# Patient Record
Sex: FEMALE | Race: WHITE | NOT HISPANIC OR LATINO | Employment: UNEMPLOYED | URBAN - NONMETROPOLITAN AREA
[De-identification: names, ages, dates, MRNs, and addresses within clinical notes are randomized per-mention and may not be internally consistent; named-entity substitution may affect disease eponyms.]

---

## 2018-10-23 ENCOUNTER — CONSULT (OUTPATIENT)
Dept: CARDIOLOGY | Facility: CLINIC | Age: 38
End: 2018-10-23

## 2018-10-23 VITALS
BODY MASS INDEX: 25.55 KG/M2 | WEIGHT: 159 LBS | DIASTOLIC BLOOD PRESSURE: 72 MMHG | SYSTOLIC BLOOD PRESSURE: 118 MMHG | HEART RATE: 60 BPM | HEIGHT: 66 IN

## 2018-10-23 DIAGNOSIS — R07.89 OTHER CHEST PAIN: Primary | ICD-10-CM

## 2018-10-23 DIAGNOSIS — R42 DIZZINESS: ICD-10-CM

## 2018-10-23 DIAGNOSIS — Z72.0 TOBACCO ABUSE: ICD-10-CM

## 2018-10-23 DIAGNOSIS — E78.00 HYPERCHOLESTEREMIA: ICD-10-CM

## 2018-10-23 DIAGNOSIS — R06.02 SHORTNESS OF BREATH: ICD-10-CM

## 2018-10-23 DIAGNOSIS — R01.1 MURMUR, CARDIAC: ICD-10-CM

## 2018-10-23 PROCEDURE — 99406 BEHAV CHNG SMOKING 3-10 MIN: CPT | Performed by: INTERNAL MEDICINE

## 2018-10-23 PROCEDURE — 93000 ELECTROCARDIOGRAM COMPLETE: CPT | Performed by: INTERNAL MEDICINE

## 2018-10-23 PROCEDURE — 99204 OFFICE O/P NEW MOD 45 MIN: CPT | Performed by: INTERNAL MEDICINE

## 2018-10-23 RX ORDER — NICOTINE 21 MG/24HR
1 PATCH, TRANSDERMAL 24 HOURS TRANSDERMAL EVERY 24 HOURS
Qty: 30 PATCH | Refills: 6 | Status: ON HOLD | OUTPATIENT
Start: 2018-10-23 | End: 2022-07-01

## 2018-10-23 RX ORDER — OMEPRAZOLE 20 MG/1
20 CAPSULE, DELAYED RELEASE ORAL DAILY
Status: ON HOLD | COMMUNITY
End: 2022-07-05 | Stop reason: SDUPTHER

## 2018-10-23 RX ORDER — BUPRENORPHINE HYDROCHLORIDE AND NALOXONE HYDROCHLORIDE DIHYDRATE 8; 2 MG/1; MG/1
1.75 TABLET SUBLINGUAL DAILY
Status: ON HOLD | COMMUNITY
End: 2022-07-05 | Stop reason: SDUPTHER

## 2018-10-23 RX ORDER — TRAZODONE HYDROCHLORIDE 100 MG/1
100 TABLET ORAL NIGHTLY
Status: ON HOLD | COMMUNITY
End: 2022-07-01

## 2018-10-23 RX ORDER — ALBUTEROL SULFATE 90 UG/1
2 AEROSOL, METERED RESPIRATORY (INHALATION) EVERY 4 HOURS PRN
Status: ON HOLD | COMMUNITY
End: 2022-07-05 | Stop reason: SDUPTHER

## 2018-10-23 RX ORDER — CLONIDINE HYDROCHLORIDE 0.1 MG/1
0.1 TABLET ORAL NIGHTLY
Status: ON HOLD | COMMUNITY
End: 2022-07-01

## 2018-10-23 RX ORDER — FLUOXETINE HYDROCHLORIDE 40 MG/1
80 CAPSULE ORAL DAILY
Status: ON HOLD | COMMUNITY
End: 2022-07-05 | Stop reason: SDUPTHER

## 2018-10-23 NOTE — PROGRESS NOTES
"Chief Complaint   Patient presents with   • Establish Care     Patient referred due to abnormal EKG.   • Chest Pain     She reports having pressure to left chest that radiated to shoulders. Had episode 2 weeks ago when outside sharp pain to left chest, had nausea and vomiting. She states \"everything went black for a few seconds\". She states \"the other night I was busting wood and it felt like my heart was going to explode\".   • Dizziness     started 2 weeks ago and continues to have random dizziness.   • Shortness of Breath     She states has had for long period of time, patient does continue to smoke.   • Aspirin     Patient does not take.        CARDIAC COMPLAINTS  chest pressure/discomfort, dyspnea and Dizziness      Subjective   Juanita Barnett is a 38 y.o. female came in today for her initial cardiac evaluation.  She has history of tobacco use and also history of drug use in the past.  For the last few years, she has been using Suboxone to help manage her drug habit.  She has been noticing chest pain for the last few weeks.  About 2 weeks ago she was outside busting wood, when she started noticing significant headache, then she started noticing dizziness and almost passed out.  She had sharp left-sided chest pain associated with nausea and vomiting.  Since then, she has been having few episodes of chest pain and dizziness.  She had some labs done and most of her renal function and liver functions were normal she also has history of chronically abnormal hepatitis A,B as well as hepatitis C antibodies.  She also has problem in the form of some kind of clotting disorder.  In 2004, she also had history of pulmonary embolism.  She has not been checked for any gallbladder abnormality.  She unfortunately still continues to smoke.    No past surgical history on file.    Current Outpatient Prescriptions   Medication Sig Dispense Refill   • albuterol (PROVENTIL HFA;VENTOLIN HFA) 108 (90 Base) MCG/ACT inhaler " Inhale 2 puffs Every 6 (Six) Hours As Needed for Wheezing.     • buprenorphine-naloxone (SUBOXONE) 8-2 MG per SL tablet 1 1/2 tablets daily     • CloNIDine (CATAPRES) 0.1 MG tablet Take 0.1 mg by mouth Every Night.     • FLUoxetine (PROzac) 20 MG capsule Take 20 mg by mouth Daily.     • omeprazole (priLOSEC) 20 MG capsule Take 20 mg by mouth Daily.     • traZODone (DESYREL) 100 MG tablet Take 100 mg by mouth Every Night.     • nicotine (NICODERM CQ) 21 MG/24HR patch Place 1 patch on the skin as directed by provider Daily. 30 patch 6   • varenicline (CHANTIX STARTING MONTH PAK) 0.5 MG X 11 & 1 MG X 42 tablet Take 0.5 mg one daily on days 1-3 and and 0.5 mg twice daily on days 4-7.Then 1 mg twice daily for a total of 12 weeks. 60 tablet 6     No current facility-administered medications for this visit.            ALLERGIES:  Sulfa antibiotics    Past Medical History:   Diagnosis Date   • Clotting disorder (CMS/HCC)    • Difficulty sleeping    • GERD (gastroesophageal reflux disease)    • Hepatitis C    • Hx of hysterectomy    • Hypertension    • Opiate dependence (CMS/HCC)    • Pulmonary embolism (CMS/HCC) 2004    x2        History   Smoking Status   • Current Every Day Smoker   • Packs/day: 2.00   Smokeless Tobacco   • Never Used          Family History   Problem Relation Age of Onset   • No Known Problems Mother    • No Known Problems Father    • No Known Problems Brother    • Heart disease Maternal Grandmother    • Mitral valve prolapse Maternal Grandmother    • Hyperlipidemia Maternal Grandmother        Review of Systems   Constitution: Positive for malaise/fatigue. Negative for decreased appetite.   HENT: Negative for congestion and sore throat.    Eyes: Negative for blurred vision.   Cardiovascular: Positive for chest pain.   Respiratory: Positive for cough and shortness of breath. Negative for snoring.    Endocrine: Negative for cold intolerance and heat intolerance.   Hematologic/Lymphatic: Negative for  "adenopathy. Does not bruise/bleed easily.   Skin: Negative for itching, nail changes and skin cancer.   Musculoskeletal: Negative for arthritis and myalgias.   Gastrointestinal: Positive for nausea. Negative for abdominal pain, dysphagia and heartburn.   Genitourinary: Negative for bladder incontinence and frequency.   Neurological: Negative for dizziness, light-headedness, seizures and vertigo.   Psychiatric/Behavioral: Negative for altered mental status.   Allergic/Immunologic: Negative for environmental allergies and hives.       Diabetes- No  Thyroid- normal    Objective     /72 (BP Location: Right arm)   Pulse 60   Ht 167.6 cm (66\")   Wt 72.1 kg (159 lb)   BMI 25.66 kg/m²     Physical Exam   Constitutional: She is oriented to person, place, and time. She appears well-developed and well-nourished.   HENT:   Head: Normocephalic.   Nose: Nose normal.   Eyes: Pupils are equal, round, and reactive to light. EOM are normal.   Neck: Normal range of motion. Neck supple.   Cardiovascular: Normal rate, regular rhythm, S1 normal and S2 normal.    Murmur heard.  Pulmonary/Chest: Effort normal and breath sounds normal.   Abdominal: Soft. Bowel sounds are normal.   Musculoskeletal: Normal range of motion. She exhibits no edema.   Neurological: She is alert and oriented to person, place, and time.   Skin: Skin is warm and dry.   Psychiatric: She has a normal mood and affect.         ECG 12 Lead  Date/Time: 10/23/2018 1:22 PM  Performed by: YESSENIA FERNANDEZ  Authorized by: YESSENIA FERNANDEZ   Previous ECG: no previous ECG available  Rhythm: sinus rhythm  Rate: normal  QRS axis: normal  Clinical impression: normal ECG              Assessment/Plan   Patient's Body mass index is 25.66 kg/m². BMI is above normal parameters. Recommendations include: nutrition counseling.     Juanita was seen today for establish care, chest pain, dizziness, shortness of breath and aspirin.    Diagnoses and all orders for this " visit:    Other chest pain  -     Treadmill Stress Test; Future  -     High Sensitivity CRP; Future    Shortness of breath  -     Adult Transthoracic Echo Complete W/ Cont if Necessary Per Protocol; Future    Dizziness  -     Adult Transthoracic Echo Complete W/ Cont if Necessary Per Protocol; Future    Murmur, cardiac    Tobacco abuse  -     varenicline (CHANTIX STARTING MONTH PAK) 0.5 MG X 11 & 1 MG X 42 tablet; Take 0.5 mg one daily on days 1-3 and and 0.5 mg twice daily on days 4-7.Then 1 mg twice daily for a total of 12 weeks.  -     Discontinue: nicotine (NICOTROL) 10 MG inhaler; Inhale 1 puff As Needed for Smoking Cessation.  -     nicotine (NICODERM CQ) 21 MG/24HR patch; Place 1 patch on the skin as directed by provider Daily.    Hypercholesteremia  -     Lipid Panel; Future     At baseline, her heart rate and blood pressure appears to be within normal limit.  Her BMI is borderline elevated at 26.  Her EKG showed normal sinus rhythm with no significant ST-T changes.  Her clinical examination was unremarkable other than short systolic murmur at the mitral area.  She did not have any carotid bruit at this time.  I had a long talk with her about the smoking and the increased risk.  She is willing to try to quit smoking with the use of Chantix and nicotine patches.  I did give her a prescription for the same.  I scheduled her to undergo an echocardiogram to evaluate the LV function and the valvular structures.  I also scheduled her to undergo a regular stress test to evaluate her functional status, chronotropic response and to rule out any stress-induced arrhythmia and ischemia.  She also is going to have labs done to check her lipids and CRP level.  She is already taking a PPI and will continue the same.  Based on the results of these tests, further recommendations will be made.               Electronically signed by Mayra Prajapati MD October 23, 2018 1:13 PM

## 2018-10-23 NOTE — PROGRESS NOTES
I advised Juanita of the risks of continuing to use tobacco, and I provided her with tobacco cessation educational materials in the After Visit Summary.     During this visit, I spent 3-4 minutes counseling the patient regarding tobacco cessation.

## 2018-10-25 ENCOUNTER — HOSPITAL ENCOUNTER (OUTPATIENT)
Dept: CARDIOLOGY | Facility: HOSPITAL | Age: 38
Discharge: HOME | End: 2018-10-25
Attending: INTERNAL MEDICINE | Admitting: INTERNAL MEDICINE

## 2018-10-25 ENCOUNTER — HOSPITAL ENCOUNTER (OUTPATIENT)
Dept: CARDIOLOGY | Facility: HOSPITAL | Age: 38
Discharge: HOME | End: 2018-10-25
Attending: INTERNAL MEDICINE

## 2018-10-25 DIAGNOSIS — R06.02 SHORTNESS OF BREATH: ICD-10-CM

## 2018-10-25 DIAGNOSIS — R42 DIZZINESS: ICD-10-CM

## 2018-10-25 DIAGNOSIS — R07.89 OTHER CHEST PAIN: ICD-10-CM

## 2018-10-25 LAB
BH CV ECHO MEAS - ACS: 2 CM
BH CV ECHO MEAS - AO MAX PG: 4.9 MMHG
BH CV ECHO MEAS - AO MEAN PG: 2.9 MMHG
BH CV ECHO MEAS - AO ROOT AREA (BSA CORRECTED): 1.7
BH CV ECHO MEAS - AO ROOT AREA: 7.4 CM^2
BH CV ECHO MEAS - AO ROOT DIAM: 3.1 CM
BH CV ECHO MEAS - AO V2 MAX: 111.1 CM/SEC
BH CV ECHO MEAS - AO V2 MEAN: 80.2 CM/SEC
BH CV ECHO MEAS - AO V2 VTI: 27.5 CM
BH CV ECHO MEAS - BSA(HAYCOCK): 1.8 M^2
BH CV ECHO MEAS - BSA: 1.8 M^2
BH CV ECHO MEAS - BZI_BMI: 25.7 KILOGRAMS/M^2
BH CV ECHO MEAS - BZI_METRIC_HEIGHT: 167.6 CM
BH CV ECHO MEAS - BZI_METRIC_WEIGHT: 72.1 KG
BH CV ECHO MEAS - EDV(CUBED): 64 ML
BH CV ECHO MEAS - EDV(TEICH): 70 ML
BH CV ECHO MEAS - EF(CUBED): 73.6 %
BH CV ECHO MEAS - EF(TEICH): 66 %
BH CV ECHO MEAS - ESV(CUBED): 16.9 ML
BH CV ECHO MEAS - ESV(TEICH): 23.8 ML
BH CV ECHO MEAS - FS: 35.8 %
BH CV ECHO MEAS - IVS/LVPW: 1.4
BH CV ECHO MEAS - IVSD: 1 CM
BH CV ECHO MEAS - LA DIMENSION: 3.6 CM
BH CV ECHO MEAS - LA/AO: 1.2
BH CV ECHO MEAS - LV IVRT: 0.11 SEC
BH CV ECHO MEAS - LV MASS(C)D: 104.9 GRAMS
BH CV ECHO MEAS - LV MASS(C)DI: 57.8 GRAMS/M^2
BH CV ECHO MEAS - LVIDD: 4 CM
BH CV ECHO MEAS - LVIDS: 2.6 CM
BH CV ECHO MEAS - LVPWD: 0.73 CM
BH CV ECHO MEAS - MITRAL HR: 98 BPM
BH CV ECHO MEAS - MITRAL R-R: 0.61 SEC
BH CV ECHO MEAS - MR MAX PG: 29.8 MMHG
BH CV ECHO MEAS - MR MAX VEL: 272.8 CM/SEC
BH CV ECHO MEAS - MV A MAX VEL: 44 CM/SEC
BH CV ECHO MEAS - MV DEC SLOPE: 371.3 CM/SEC^2
BH CV ECHO MEAS - MV DEC TIME: 0.23 SEC
BH CV ECHO MEAS - MV E MAX VEL: 86.1 CM/SEC
BH CV ECHO MEAS - MV E/A: 2
BH CV ECHO MEAS - MV MAX PG: 5 MMHG
BH CV ECHO MEAS - MV MEAN PG: 1.4 MMHG
BH CV ECHO MEAS - MV V2 MAX: 111.3 CM/SEC
BH CV ECHO MEAS - MV V2 MEAN: 51.9 CM/SEC
BH CV ECHO MEAS - MV V2 VTI: 31.8 CM
BH CV ECHO MEAS - PA MAX PG: 3.6 MMHG
BH CV ECHO MEAS - PA MEAN PG: 2.2 MMHG
BH CV ECHO MEAS - PA V2 MAX: 94.6 CM/SEC
BH CV ECHO MEAS - PA V2 MEAN: 70.9 CM/SEC
BH CV ECHO MEAS - PA V2 VTI: 21.6 CM
BH CV ECHO MEAS - PULM. HR: 197 BPM
BH CV ECHO MEAS - PULM. R-R: 0.3 SEC
BH CV ECHO MEAS - RAP SYSTOLE: 10 MMHG
BH CV ECHO MEAS - RVDD: 2.9 CM
BH CV ECHO MEAS - RVSP: 27.4 MMHG
BH CV ECHO MEAS - SI(AO): 111.8 ML/M^2
BH CV ECHO MEAS - SI(CUBED): 25.9 ML/M^2
BH CV ECHO MEAS - SI(TEICH): 25.4 ML/M^2
BH CV ECHO MEAS - SV(AO): 202.8 ML
BH CV ECHO MEAS - SV(CUBED): 47.1 ML
BH CV ECHO MEAS - SV(TEICH): 46.2 ML
BH CV ECHO MEAS - TR MAX VEL: 204.7 CM/SEC
BH CV STRESS DURATION MIN STAGE 1: 3
BH CV STRESS DURATION SEC STAGE 1: 0
BH CV STRESS GRADE STAGE 1: 10
BH CV STRESS METS STAGE 1: 5
BH CV STRESS PROTOCOL 1: NORMAL
BH CV STRESS RECOVERY BP: NORMAL MMHG
BH CV STRESS RECOVERY HR: 54 BPM
BH CV STRESS SPEED STAGE 1: 1.7
BH CV STRESS STAGE 1: 1
MAXIMAL PREDICTED HEART RATE: 182 BPM
MAXIMAL PREDICTED HEART RATE: 182 BPM
PERCENT MAX PREDICTED HR: 82.97 %
STRESS BASELINE BP: NORMAL MMHG
STRESS BASELINE HR: 73 BPM
STRESS PERCENT HR: 98 %
STRESS POST ESTIMATED WORKLOAD: 7 METS
STRESS POST EXERCISE DUR MIN: 4 MIN
STRESS POST EXERCISE DUR SEC: 32 SEC
STRESS POST PEAK BP: NORMAL MMHG
STRESS POST PEAK HR: 151 BPM
STRESS TARGET HR: 155 BPM
STRESS TARGET HR: 155 BPM

## 2018-10-25 PROCEDURE — 93018 CV STRESS TEST I&R ONLY: CPT | Performed by: INTERNAL MEDICINE

## 2018-10-25 PROCEDURE — 93017 CV STRESS TEST TRACING ONLY: CPT

## 2018-10-25 PROCEDURE — 93306 TTE W/DOPPLER COMPLETE: CPT | Performed by: INTERNAL MEDICINE

## 2018-10-25 PROCEDURE — 93306 TTE W/DOPPLER COMPLETE: CPT

## 2019-05-29 RX ORDER — OMEPRAZOLE 20 MG/1
CAPSULE, DELAYED RELEASE ORAL
Qty: 30 CAPSULE | OUTPATIENT
Start: 2019-05-29

## 2022-06-30 ENCOUNTER — HOSPITAL ENCOUNTER (EMERGENCY)
Facility: HOSPITAL | Age: 42
Discharge: PSYCHIATRIC OR SUBSTANCE USE FACILITY | End: 2022-06-30
Attending: EMERGENCY MEDICINE

## 2022-06-30 ENCOUNTER — HOSPITAL ENCOUNTER (INPATIENT)
Facility: HOSPITAL | Age: 42
LOS: 5 days | Discharge: HOME | End: 2022-07-05
Attending: PSYCHIATRY & NEUROLOGY | Admitting: PSYCHIATRY & NEUROLOGY

## 2022-06-30 VITALS
HEIGHT: 66 IN | OXYGEN SATURATION: 100 % | TEMPERATURE: 97.5 F | HEART RATE: 63 BPM | DIASTOLIC BLOOD PRESSURE: 90 MMHG | WEIGHT: 200 LBS | BODY MASS INDEX: 32.14 KG/M2 | SYSTOLIC BLOOD PRESSURE: 134 MMHG | RESPIRATION RATE: 18 BRPM

## 2022-06-30 DIAGNOSIS — F11.20 UNCOMPLICATED OPIOID DEPENDENCE: Primary | ICD-10-CM

## 2022-06-30 DIAGNOSIS — F13.10 BENZODIAZEPINE ABUSE: Primary | ICD-10-CM

## 2022-06-30 LAB
ALBUMIN SERPL-MCNC: 4.28 G/DL (ref 3.5–5.2)
ALBUMIN/GLOB SERPL: 1.6 G/DL
ALP SERPL-CCNC: 87 U/L (ref 39–117)
ALT SERPL W P-5'-P-CCNC: 8 U/L (ref 1–33)
AMPHET+METHAMPHET UR QL: NEGATIVE
AMPHETAMINES UR QL: POSITIVE
ANION GAP SERPL CALCULATED.3IONS-SCNC: 9.5 MMOL/L (ref 5–15)
AST SERPL-CCNC: 15 U/L (ref 1–32)
B-HCG UR QL: NEGATIVE
BARBITURATES UR QL SCN: NEGATIVE
BASOPHILS # BLD AUTO: 0.06 10*3/MM3 (ref 0–0.2)
BASOPHILS NFR BLD AUTO: 0.7 % (ref 0–1.5)
BENZODIAZ UR QL SCN: POSITIVE
BILIRUB SERPL-MCNC: 0.2 MG/DL (ref 0–1.2)
BILIRUB UR QL STRIP: NEGATIVE
BUN SERPL-MCNC: 9 MG/DL (ref 6–20)
BUN/CREAT SERPL: 11.4 (ref 7–25)
BUPRENORPHINE SERPL-MCNC: POSITIVE NG/ML
CALCIUM SPEC-SCNC: 9.5 MG/DL (ref 8.6–10.5)
CANNABINOIDS SERPL QL: POSITIVE
CHLORIDE SERPL-SCNC: 100 MMOL/L (ref 98–107)
CLARITY UR: ABNORMAL
CO2 SERPL-SCNC: 27.5 MMOL/L (ref 22–29)
COCAINE UR QL: NEGATIVE
COLOR UR: YELLOW
CREAT SERPL-MCNC: 0.79 MG/DL (ref 0.57–1)
DEPRECATED RDW RBC AUTO: 43.8 FL (ref 37–54)
EGFRCR SERPLBLD CKD-EPI 2021: 95.9 ML/MIN/1.73
EOSINOPHIL # BLD AUTO: 0.4 10*3/MM3 (ref 0–0.4)
EOSINOPHIL NFR BLD AUTO: 4.4 % (ref 0.3–6.2)
ERYTHROCYTE [DISTWIDTH] IN BLOOD BY AUTOMATED COUNT: 13.2 % (ref 12.3–15.4)
ETHANOL BLD-MCNC: <10 MG/DL (ref 0–10)
ETHANOL UR QL: <0.01 %
FLUAV SUBTYP SPEC NAA+PROBE: NOT DETECTED
FLUBV RNA ISLT QL NAA+PROBE: NOT DETECTED
GLOBULIN UR ELPH-MCNC: 2.7 GM/DL
GLUCOSE SERPL-MCNC: 94 MG/DL (ref 65–99)
GLUCOSE UR STRIP-MCNC: NEGATIVE MG/DL
HCT VFR BLD AUTO: 39.8 % (ref 34–46.6)
HGB BLD-MCNC: 13.4 G/DL (ref 12–15.9)
HGB UR QL STRIP.AUTO: NEGATIVE
IMM GRANULOCYTES # BLD AUTO: 0.02 10*3/MM3 (ref 0–0.05)
IMM GRANULOCYTES NFR BLD AUTO: 0.2 % (ref 0–0.5)
KETONES UR QL STRIP: NEGATIVE
LEUKOCYTE ESTERASE UR QL STRIP.AUTO: NEGATIVE
LYMPHOCYTES # BLD AUTO: 2.69 10*3/MM3 (ref 0.7–3.1)
LYMPHOCYTES NFR BLD AUTO: 29.9 % (ref 19.6–45.3)
MAGNESIUM SERPL-MCNC: 1.9 MG/DL (ref 1.6–2.6)
MCH RBC QN AUTO: 30.2 PG (ref 26.6–33)
MCHC RBC AUTO-ENTMCNC: 33.7 G/DL (ref 31.5–35.7)
MCV RBC AUTO: 89.6 FL (ref 79–97)
METHADONE UR QL SCN: NEGATIVE
MONOCYTES # BLD AUTO: 0.73 10*3/MM3 (ref 0.1–0.9)
MONOCYTES NFR BLD AUTO: 8.1 % (ref 5–12)
NEUTROPHILS NFR BLD AUTO: 5.11 10*3/MM3 (ref 1.7–7)
NEUTROPHILS NFR BLD AUTO: 56.7 % (ref 42.7–76)
NITRITE UR QL STRIP: NEGATIVE
NRBC BLD AUTO-RTO: 0 /100 WBC (ref 0–0.2)
OPIATES UR QL: NEGATIVE
OXYCODONE UR QL SCN: NEGATIVE
PCP UR QL SCN: NEGATIVE
PH UR STRIP.AUTO: 6.5 [PH] (ref 5–8)
PLATELET # BLD AUTO: 286 10*3/MM3 (ref 140–450)
PMV BLD AUTO: 9.7 FL (ref 6–12)
POTASSIUM SERPL-SCNC: 4 MMOL/L (ref 3.5–5.2)
PROPOXYPH UR QL: NEGATIVE
PROT SERPL-MCNC: 7 G/DL (ref 6–8.5)
PROT UR QL STRIP: NEGATIVE
RBC # BLD AUTO: 4.44 10*6/MM3 (ref 3.77–5.28)
SARS-COV-2 RNA PNL SPEC NAA+PROBE: NOT DETECTED
SODIUM SERPL-SCNC: 137 MMOL/L (ref 136–145)
SP GR UR STRIP: 1.02 (ref 1–1.03)
TRICYCLICS UR QL SCN: NEGATIVE
UROBILINOGEN UR QL STRIP: ABNORMAL
WBC NRBC COR # BLD: 9.01 10*3/MM3 (ref 3.4–10.8)

## 2022-06-30 PROCEDURE — HZ2ZZZZ DETOXIFICATION SERVICES FOR SUBSTANCE ABUSE TREATMENT: ICD-10-PCS | Performed by: PSYCHIATRY & NEUROLOGY

## 2022-06-30 PROCEDURE — 93010 ELECTROCARDIOGRAM REPORT: CPT | Performed by: INTERNAL MEDICINE

## 2022-06-30 PROCEDURE — 82077 ASSAY SPEC XCP UR&BREATH IA: CPT | Performed by: EMERGENCY MEDICINE

## 2022-06-30 PROCEDURE — 81025 URINE PREGNANCY TEST: CPT | Performed by: EMERGENCY MEDICINE

## 2022-06-30 PROCEDURE — 85025 COMPLETE CBC W/AUTO DIFF WBC: CPT | Performed by: EMERGENCY MEDICINE

## 2022-06-30 PROCEDURE — 36415 COLL VENOUS BLD VENIPUNCTURE: CPT

## 2022-06-30 PROCEDURE — 99284 EMERGENCY DEPT VISIT MOD MDM: CPT

## 2022-06-30 PROCEDURE — 81003 URINALYSIS AUTO W/O SCOPE: CPT | Performed by: EMERGENCY MEDICINE

## 2022-06-30 PROCEDURE — 93005 ELECTROCARDIOGRAM TRACING: CPT | Performed by: PSYCHIATRY & NEUROLOGY

## 2022-06-30 PROCEDURE — 80306 DRUG TEST PRSMV INSTRMNT: CPT | Performed by: EMERGENCY MEDICINE

## 2022-06-30 PROCEDURE — 63710000001 ONDANSETRON PER 8 MG: Performed by: PSYCHIATRY & NEUROLOGY

## 2022-06-30 PROCEDURE — 99285 EMERGENCY DEPT VISIT HI MDM: CPT

## 2022-06-30 PROCEDURE — 80053 COMPREHEN METABOLIC PANEL: CPT | Performed by: EMERGENCY MEDICINE

## 2022-06-30 PROCEDURE — 83735 ASSAY OF MAGNESIUM: CPT | Performed by: EMERGENCY MEDICINE

## 2022-06-30 PROCEDURE — 87636 SARSCOV2 & INF A&B AMP PRB: CPT | Performed by: EMERGENCY MEDICINE

## 2022-06-30 RX ORDER — CLONIDINE HYDROCHLORIDE 0.1 MG/1
0.1 TABLET ORAL 4 TIMES DAILY PRN
Status: ACTIVE | OUTPATIENT
Start: 2022-07-01 | End: 2022-07-02

## 2022-06-30 RX ORDER — BENZONATATE 100 MG/1
100 CAPSULE ORAL 3 TIMES DAILY PRN
Status: DISCONTINUED | OUTPATIENT
Start: 2022-06-30 | End: 2022-07-05 | Stop reason: HOSPADM

## 2022-06-30 RX ORDER — LORAZEPAM 0.5 MG/1
0.5 TABLET ORAL
Status: COMPLETED | OUTPATIENT
Start: 2022-07-04 | End: 2022-07-04

## 2022-06-30 RX ORDER — LORAZEPAM 2 MG/1
2 TABLET ORAL
Status: COMPLETED | OUTPATIENT
Start: 2022-07-01 | End: 2022-07-01

## 2022-06-30 RX ORDER — ACETAMINOPHEN 325 MG/1
650 TABLET ORAL EVERY 6 HOURS PRN
Status: DISCONTINUED | OUTPATIENT
Start: 2022-06-30 | End: 2022-07-05 | Stop reason: HOSPADM

## 2022-06-30 RX ORDER — LORAZEPAM 2 MG/1
2 TABLET ORAL
Status: ACTIVE | OUTPATIENT
Start: 2022-06-30 | End: 2022-07-01

## 2022-06-30 RX ORDER — FAMOTIDINE 20 MG/1
20 TABLET, FILM COATED ORAL 2 TIMES DAILY PRN
Status: DISCONTINUED | OUTPATIENT
Start: 2022-06-30 | End: 2022-07-05 | Stop reason: HOSPADM

## 2022-06-30 RX ORDER — LORAZEPAM 0.5 MG/1
0.5 TABLET ORAL EVERY 4 HOURS PRN
Status: ACTIVE | OUTPATIENT
Start: 2022-07-04 | End: 2022-07-05

## 2022-06-30 RX ORDER — CLONIDINE HYDROCHLORIDE 0.1 MG/1
0.1 TABLET ORAL 3 TIMES DAILY PRN
Status: ACTIVE | OUTPATIENT
Start: 2022-07-02 | End: 2022-07-03

## 2022-06-30 RX ORDER — LORAZEPAM 2 MG/1
2 TABLET ORAL EVERY 4 HOURS PRN
Status: ACTIVE | OUTPATIENT
Start: 2022-07-01 | End: 2022-07-02

## 2022-06-30 RX ORDER — ECHINACEA PURPUREA EXTRACT 125 MG
2 TABLET ORAL AS NEEDED
Status: DISCONTINUED | OUTPATIENT
Start: 2022-06-30 | End: 2022-07-05 | Stop reason: HOSPADM

## 2022-06-30 RX ORDER — LOPERAMIDE HYDROCHLORIDE 2 MG/1
2 CAPSULE ORAL
Status: DISCONTINUED | OUTPATIENT
Start: 2022-06-30 | End: 2022-07-05 | Stop reason: HOSPADM

## 2022-06-30 RX ORDER — LORAZEPAM 1 MG/1
1 TABLET ORAL EVERY 4 HOURS PRN
Status: ACTIVE | OUTPATIENT
Start: 2022-07-03 | End: 2022-07-04

## 2022-06-30 RX ORDER — ONDANSETRON 4 MG/1
4 TABLET, FILM COATED ORAL EVERY 6 HOURS PRN
Status: DISCONTINUED | OUTPATIENT
Start: 2022-06-30 | End: 2022-07-05 | Stop reason: HOSPADM

## 2022-06-30 RX ORDER — TRAZODONE HYDROCHLORIDE 50 MG/1
50 TABLET ORAL NIGHTLY PRN
Status: DISCONTINUED | OUTPATIENT
Start: 2022-06-30 | End: 2022-07-05 | Stop reason: HOSPADM

## 2022-06-30 RX ORDER — LORAZEPAM 1 MG/1
1 TABLET ORAL
Status: COMPLETED | OUTPATIENT
Start: 2022-07-03 | End: 2022-07-03

## 2022-06-30 RX ORDER — NICOTINE 21 MG/24HR
1 PATCH, TRANSDERMAL 24 HOURS TRANSDERMAL
Status: DISCONTINUED | OUTPATIENT
Start: 2022-07-01 | End: 2022-07-05 | Stop reason: HOSPADM

## 2022-06-30 RX ORDER — HYDROXYZINE 50 MG/1
50 TABLET, FILM COATED ORAL EVERY 6 HOURS PRN
Status: DISCONTINUED | OUTPATIENT
Start: 2022-06-30 | End: 2022-07-05 | Stop reason: HOSPADM

## 2022-06-30 RX ORDER — CYCLOBENZAPRINE HCL 10 MG
10 TABLET ORAL 3 TIMES DAILY PRN
Status: DISCONTINUED | OUTPATIENT
Start: 2022-06-30 | End: 2022-07-05 | Stop reason: HOSPADM

## 2022-06-30 RX ORDER — IBUPROFEN 400 MG/1
400 TABLET ORAL EVERY 6 HOURS PRN
Status: DISCONTINUED | OUTPATIENT
Start: 2022-06-30 | End: 2022-07-05 | Stop reason: HOSPADM

## 2022-06-30 RX ORDER — ALUMINA, MAGNESIA, AND SIMETHICONE 2400; 2400; 240 MG/30ML; MG/30ML; MG/30ML
15 SUSPENSION ORAL EVERY 6 HOURS PRN
Status: DISCONTINUED | OUTPATIENT
Start: 2022-06-30 | End: 2022-07-05 | Stop reason: HOSPADM

## 2022-06-30 RX ORDER — BENZTROPINE MESYLATE 1 MG/ML
1 INJECTION INTRAMUSCULAR; INTRAVENOUS ONCE AS NEEDED
Status: DISCONTINUED | OUTPATIENT
Start: 2022-06-30 | End: 2022-07-05 | Stop reason: HOSPADM

## 2022-06-30 RX ORDER — CLONIDINE HYDROCHLORIDE 0.1 MG/1
0.1 TABLET ORAL DAILY PRN
Status: ACTIVE | OUTPATIENT
Start: 2022-07-04 | End: 2022-07-05

## 2022-06-30 RX ORDER — BENZTROPINE MESYLATE 1 MG/1
2 TABLET ORAL ONCE AS NEEDED
Status: DISCONTINUED | OUTPATIENT
Start: 2022-06-30 | End: 2022-07-05 | Stop reason: HOSPADM

## 2022-06-30 RX ORDER — CLONIDINE HYDROCHLORIDE 0.1 MG/1
0.1 TABLET ORAL 2 TIMES DAILY PRN
Status: ACTIVE | OUTPATIENT
Start: 2022-07-03 | End: 2022-07-04

## 2022-06-30 RX ORDER — CLONIDINE HYDROCHLORIDE 0.1 MG/1
0.1 TABLET ORAL 4 TIMES DAILY PRN
Status: ACTIVE | OUTPATIENT
Start: 2022-06-30 | End: 2022-07-01

## 2022-06-30 RX ORDER — DICYCLOMINE HYDROCHLORIDE 10 MG/1
10 CAPSULE ORAL 3 TIMES DAILY PRN
Status: DISCONTINUED | OUTPATIENT
Start: 2022-06-30 | End: 2022-07-05 | Stop reason: HOSPADM

## 2022-06-30 RX ADMIN — TRAZODONE HYDROCHLORIDE 50 MG: 50 TABLET ORAL at 22:14

## 2022-06-30 RX ADMIN — ONDANSETRON HYDROCHLORIDE 4 MG: 4 TABLET, FILM COATED ORAL at 22:14

## 2022-06-30 RX ADMIN — IBUPROFEN 400 MG: 400 TABLET, FILM COATED ORAL at 22:14

## 2022-06-30 RX ADMIN — HYDROXYZINE HYDROCHLORIDE 50 MG: 50 TABLET ORAL at 22:14

## 2022-07-01 LAB
HAV IGM SERPL QL IA: ABNORMAL
HBV CORE IGM SERPL QL IA: ABNORMAL
HBV SURFACE AG SERPL QL IA: ABNORMAL
HCV AB SER DONR QL: REACTIVE
HIV1+2 AB SER QL: NORMAL
QT INTERVAL: 410 MS
QTC INTERVAL: 399 MS

## 2022-07-01 PROCEDURE — G0432 EIA HIV-1/HIV-2 SCREEN: HCPCS | Performed by: PSYCHIATRY & NEUROLOGY

## 2022-07-01 PROCEDURE — 99223 1ST HOSP IP/OBS HIGH 75: CPT | Performed by: PSYCHIATRY & NEUROLOGY

## 2022-07-01 PROCEDURE — 80074 ACUTE HEPATITIS PANEL: CPT | Performed by: PSYCHIATRY & NEUROLOGY

## 2022-07-01 RX ORDER — BUPRENORPHINE HYDROCHLORIDE AND NALOXONE HYDROCHLORIDE DIHYDRATE 8; 2 MG/1; MG/1
1.75 TABLET SUBLINGUAL DAILY
Status: DISCONTINUED | OUTPATIENT
Start: 2022-07-02 | End: 2022-07-05 | Stop reason: HOSPADM

## 2022-07-01 RX ORDER — CETIRIZINE HYDROCHLORIDE 10 MG/1
10 TABLET ORAL DAILY
Status: ON HOLD | COMMUNITY
End: 2022-07-05 | Stop reason: SDUPTHER

## 2022-07-01 RX ORDER — ALBUTEROL SULFATE 90 UG/1
2 AEROSOL, METERED RESPIRATORY (INHALATION) EVERY 4 HOURS PRN
Status: DISCONTINUED | OUTPATIENT
Start: 2022-07-01 | End: 2022-07-05 | Stop reason: HOSPADM

## 2022-07-01 RX ORDER — PANTOPRAZOLE SODIUM 40 MG/1
40 TABLET, DELAYED RELEASE ORAL
Status: DISCONTINUED | OUTPATIENT
Start: 2022-07-01 | End: 2022-07-05 | Stop reason: HOSPADM

## 2022-07-01 RX ORDER — ALBUTEROL SULFATE 90 UG/1
2 AEROSOL, METERED RESPIRATORY (INHALATION)
Status: DISCONTINUED | OUTPATIENT
Start: 2022-07-01 | End: 2022-07-01 | Stop reason: ALTCHOICE

## 2022-07-01 RX ORDER — PANTOPRAZOLE SODIUM 40 MG/1
40 TABLET, DELAYED RELEASE ORAL EVERY MORNING
Status: CANCELLED | OUTPATIENT
Start: 2022-07-01

## 2022-07-01 RX ORDER — FLUOXETINE HYDROCHLORIDE 20 MG/1
80 CAPSULE ORAL DAILY
Status: CANCELLED | OUTPATIENT
Start: 2022-07-01

## 2022-07-01 RX ORDER — BUPRENORPHINE HYDROCHLORIDE AND NALOXONE HYDROCHLORIDE DIHYDRATE 8; 2 MG/1; MG/1
2 TABLET SUBLINGUAL DAILY
Status: DISCONTINUED | OUTPATIENT
Start: 2022-07-01 | End: 2022-07-01 | Stop reason: ALTCHOICE

## 2022-07-01 RX ORDER — FLUOXETINE HYDROCHLORIDE 20 MG/1
40 CAPSULE ORAL DAILY
Status: DISCONTINUED | OUTPATIENT
Start: 2022-07-01 | End: 2022-07-05 | Stop reason: HOSPADM

## 2022-07-01 RX ORDER — CETIRIZINE HYDROCHLORIDE 10 MG/1
10 TABLET ORAL DAILY
Status: DISCONTINUED | OUTPATIENT
Start: 2022-07-01 | End: 2022-07-05 | Stop reason: HOSPADM

## 2022-07-01 RX ORDER — CETIRIZINE HYDROCHLORIDE 10 MG/1
10 TABLET ORAL DAILY
Status: CANCELLED | OUTPATIENT
Start: 2022-07-01

## 2022-07-01 RX ORDER — BUPRENORPHINE HYDROCHLORIDE AND NALOXONE HYDROCHLORIDE DIHYDRATE 8; 2 MG/1; MG/1
1.75 TABLET SUBLINGUAL DAILY
Status: CANCELLED | OUTPATIENT
Start: 2022-07-01

## 2022-07-01 RX ADMIN — BUPRENORPHINE HYDROCHLORIDE AND NALOXONE HYDROCHLORIDE DIHYDRATE 2 TABLET: 8; 2 TABLET SUBLINGUAL at 10:55

## 2022-07-01 RX ADMIN — IBUPROFEN 400 MG: 400 TABLET, FILM COATED ORAL at 14:31

## 2022-07-01 RX ADMIN — TRAZODONE HYDROCHLORIDE 50 MG: 50 TABLET ORAL at 21:31

## 2022-07-01 RX ADMIN — HYDROXYZINE HYDROCHLORIDE 50 MG: 50 TABLET ORAL at 21:31

## 2022-07-01 RX ADMIN — PANTOPRAZOLE SODIUM 40 MG: 40 TABLET, DELAYED RELEASE ORAL at 10:55

## 2022-07-01 RX ADMIN — FLUOXETINE HYDROCHLORIDE 40 MG: 20 CAPSULE ORAL at 10:55

## 2022-07-01 RX ADMIN — LORAZEPAM 2 MG: 2 TABLET ORAL at 09:04

## 2022-07-01 RX ADMIN — LORAZEPAM 2 MG: 2 TABLET ORAL at 21:31

## 2022-07-01 RX ADMIN — LORAZEPAM 2 MG: 2 TABLET ORAL at 14:32

## 2022-07-01 NOTE — PLAN OF CARE
Problem: Adult Behavioral Health Plan of Care  Goal: Plan of Care Review  Outcome: Ongoing, Progressing  Flowsheets  Taken 7/1/2022 1850  Progress: no change  Plan of Care Reviewed With: patient  Patient Agreement with Plan of Care: agrees  Outcome Evaluation: pt pleasant and cooperative  Taken 7/1/2022 0920  Plan of Care Reviewed With: patient  Patient Agreement with Plan of Care: agrees   Goal Outcome Evaluation:  Plan of Care Reviewed With: patient  Patient Agreement with Plan of Care: agrees     Progress: no change  Outcome Evaluation: pt yolanda and cooperative
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
## 2022-07-01 NOTE — PLAN OF CARE
"Goal Outcome Evaluation:  Plan of Care Reviewed With: patient  Patient Agreement with Plan of Care: agrees     Progress: no change  Outcome Evaluation: Patient was new admit this shift, slept through the night, rated anxiety and depression 9/10, denied SI/HI/AVH, pain 8/10 \"all over\", cravings 10/10, no complaints this shift.  "
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
## 2022-07-01 NOTE — PLAN OF CARE
Goal Outcome Evaluation:  Plan of Care Reviewed With: patient  Patient Agreement with Plan of Care: agrees  Consent Given to Review Plan with: Phu  Progress: no change  Outcome Evaluation: Met with patient in the office, completing the social history assessment and reviewing the treatment plans. Patient agreeable.      Problem: Adult Behavioral Health Plan of Care  Goal: Plan of Care Review  Outcome: Ongoing, Progressing  Flowsheets (Taken 7/1/2022 0938)  Consent Given to Review Plan with: Phu  Progress: no change  Plan of Care Reviewed With: patient  Patient Agreement with Plan of Care: agrees  Outcome Evaluation: Met with patient in the office, completing the social history assessment and reviewing the treatment plans. Patient agreeable.     Problem: Adult Behavioral Health Plan of Care  Goal: Patient-Specific Goal (Individualization)  Outcome: Ongoing, Progressing  Flowsheets (Taken 7/1/2022 0938)  Patient Personal Strengths:   resilient   resourceful  Patient-Specific Goals (Include Timeframe): Identify 1-2 cognitive distortions  Individualized Care Needs: CBT  Anxieties, Fears or Concerns: Concerned with finding relief from withdrawal symptoms  Patient Vulnerabilities:   substance abuse/addiction   limited support system   occupational insecurity   poor impulse control   lacks insight into illness     Problem: Adult Behavioral Health Plan of Care  Goal: Optimized Coping Skills in Response to Life Stressors  Outcome: Ongoing, Progressing  Flowsheets (Taken 7/1/2022 0938)  Optimized Coping Skills in Response to Life Stressors: making progress toward outcome     Problem: Adult Behavioral Health Plan of Care  Goal: Optimized Coping Skills in Response to Life Stressors  Intervention: Promote Effective Coping Strategies  Flowsheets (Taken 7/1/2022 0938)  Supportive Measures:   active listening utilized   positive reinforcement provided   self-responsibility promoted   verbalization of feelings encouraged    problem-solving facilitated   counseling provided   self-reflection promoted     Problem: Adult Behavioral Health Plan of Care  Goal: Develops/Participates in Therapeutic [place] to Support Successful Transition  Outcome: Ongoing, Progressing  Flowsheets (Taken 7/1/2022 0938)  Develops/Participates in Therapeutic [place] to Support Successful Transition: making progress toward outcome     Problem: Adult Behavioral Health Plan of Care  Goal: Develops/Participates in Therapeutic [place] to Support Successful Transition  Intervention: Foster Therapeutic [place]  Flowsheets (Taken 7/1/2022 0938)  Trust Relationship/Rapport:   care explained   reassurance provided   choices provided   emotional support provided   thoughts/feelings acknowledged   empathic listening provided   questions answered   questions encouraged     Problem: Adult Behavioral Health Plan of Care  Goal: Develops/Participates in Therapeutic [place] to Support Successful Transition  Intervention: Mutually Develop Transition Plan  Flowsheets (Taken 7/1/2022 0938)  Outpatient/Agency/Support Group Needs:   12 step program (specify)   residential services   support group(s) (specify)  Discharge Coordination/Progress: Phu  Transition Support: follow-up care discussed  Transportation Anticipated: agency  Anticipated Discharge Disposition: residential substance use unit  Transportation Concerns: none  Current Discharge Risk: substance use/abuse  Concerns to be Addressed:   mental health   discharge planning   substance/tobacco abuse/use   coping/stress  Readmission Within the Last 30 Days: no previous admission in last 30 days  Patient/Family Anticipated Services at Transition: rehabilitation services  Patient's Choice of Community Agency(s): Phu  Patient/Family Anticipates Transition to: inpatient rehabilitation facility  Offered/Gave Vendor List: no       [phone]  D: Patient is a 42-year-old  female currently residing in [place] where she has been living with her brother and sister-in-law for the past 10 years.  She reported her mother lives next door, and her uncle visits frequently.  All of her family members use drugs.  The patient has a high school education and has completed approximately 2 college courses.  She is currently unemployed, having last worked 2 months ago.  She reported difficulty maintaining employment due to inconsistent supply of drugs and the resultant withdrawal symptoms.  She identified herself as an addict, reporting her drugs of choice were benzodiazepines and methamphetamine.  Patient is prescribed Suboxone.  She maintained sobriety for 2-1/2 years on 1 occasion.  The patient was in [facility] for some time, but chose to leave after they discouraged her from using Suboxone.  Patient planned to return to a sober living house, [facility], in [place] upon discharge.    A: Patient's affect appeared somber. Her mood appeared dysthymic. She was polite and cooperative. Seemed to have a relatively optimistic attitude. Motivation seemed fair, as she was willing to begin sober living, but unwilling to consider cessation from suboxone.     P: Patient has been placed on a detox regimen. She will be monitored routinely for her safety. She will be provided with individual and group therapy. She will follow up with sober living treatment at [facility].

## 2022-07-02 PROCEDURE — 99232 SBSQ HOSP IP/OBS MODERATE 35: CPT | Performed by: PSYCHIATRY & NEUROLOGY

## 2022-07-02 RX ORDER — AMOXICILLIN AND CLAVULANATE POTASSIUM 562.5; 437.5; 62.5 MG/1; MG/1; MG/1
1 TABLET, FILM COATED, EXTENDED RELEASE ORAL EVERY 12 HOURS SCHEDULED
Status: DISCONTINUED | OUTPATIENT
Start: 2022-07-02 | End: 2022-07-05 | Stop reason: HOSPADM

## 2022-07-02 RX ADMIN — BUPRENORPHINE HYDROCHLORIDE AND NALOXONE HYDROCHLORIDE DIHYDRATE 1.75 TABLET: 8; 2 TABLET SUBLINGUAL at 08:29

## 2022-07-02 RX ADMIN — TRAZODONE HYDROCHLORIDE 50 MG: 50 TABLET ORAL at 21:29

## 2022-07-02 RX ADMIN — AMOXICILLIN AND CLAVULANATE POTASSIUM 1 TABLET: 562.5; 437.5; 62.5 TABLET, MULTILAYER, EXTENDED RELEASE ORAL at 11:39

## 2022-07-02 RX ADMIN — FLUOXETINE HYDROCHLORIDE 40 MG: 20 CAPSULE ORAL at 08:28

## 2022-07-02 RX ADMIN — LORAZEPAM 1.5 MG: 1 TABLET ORAL at 08:29

## 2022-07-02 RX ADMIN — AMOXICILLIN AND CLAVULANATE POTASSIUM 1 TABLET: 562.5; 437.5; 62.5 TABLET, MULTILAYER, EXTENDED RELEASE ORAL at 21:29

## 2022-07-02 RX ADMIN — LORAZEPAM 1.5 MG: 1 TABLET ORAL at 21:29

## 2022-07-02 RX ADMIN — CETIRIZINE HYDROCHLORIDE 10 MG: 10 TABLET, FILM COATED ORAL at 08:29

## 2022-07-02 RX ADMIN — LORAZEPAM 1.5 MG: 1 TABLET ORAL at 14:44

## 2022-07-02 RX ADMIN — PANTOPRAZOLE SODIUM 40 MG: 40 TABLET, DELAYED RELEASE ORAL at 06:14

## 2022-07-02 NOTE — PROGRESS NOTES
"INPATIENT PSYCHIATRIC PROGRESS NOTE    Name:  Juanita Barnett  :  1980  MRN:  [id number]  Visit Number:  [id number]  Length of stay:  2    SUBJECTIVE    CC/Focus of Exam: Polysubstance Abuse and withdrawal    INTERVAL HISTORY: Patient reports this morning that she is doing relatively well with withdrawal symptoms and is having mild to moderate withdrawal symptoms including fatigue, anxiety, and some nausea.  Her chief complaint this morning is some right ear pain and sore throat which has worsened.  On exam, nursing reports ear is excoriated.     Depression rating 10/10  Anxiety rating 1/10  Sleep: sleeping well  Withdrawal sx: See above  Cravin/10    Review of Systems   Constitutional: Positive for activity change and fatigue.   HENT: Positive for ear pain and sore throat.    Respiratory: Negative.    Cardiovascular: Negative.    Gastrointestinal: Negative.    Psychiatric/Behavioral: Positive for dysphoric mood. Negative for sleep disturbance.       OBJECTIVE    Temp:  [97.8 °F (36.6 °C)-98.5 °F (36.9 °C)] 98.5 °F (36.9 °C)  Heart Rate:  [59-79] 79  Resp:  [18] 18  BP: ()/(59-78) 101/78    MENTAL STATUS EXAM:  Appearance: Casually dressed, good hygeine.   Cooperation: Cooperative  Psychomotor: No psychomotor agitation/retardation, No EPS, No motor tics  Speech: normal rate, amount.  Mood: \"Doing better\" - incongruent with her report to nursing   Affect: congruent, appropriate  Thought Content: goal directed, no delusional material present  Thought process: linear, organized.  Suicidality: No SI  Homicidality: No HI  Perception: No AH/VH  Insight: fair   Judgment: fair    Lab Results (last 24 hours)     ** No results found for the last 24 hours. **             Imaging Results (Last 24 Hours)     ** No results found for the last 24 hours. **             ECG/EMG Results (most recent)     Procedure Component Value Units Date/Time    ECG 12 Lead [[id number]] Collected: 22 2357     " Updated: 22 1100     QT Interval 410 ms      QTC Interval 399 ms     Narrative:      Test Reason : Baseline Cardiac Status  Blood Pressure :   */*   mmHG  Vent. Rate :  57 BPM     Atrial Rate :  57 BPM     P-R Int : 182 ms          QRS Dur :  88 ms      QT Int : 410 ms       P-R-T Axes :  65  58  58 degrees     QTc Int : 399 ms    Sinus bradycardia  Otherwise normal ECG  No previous ECGs available  Confirmed by Molina Marks () on 2022 11:00:42 AM    Referred By:            Confirmed By: Molina Marks           ALLERGIES: Sulfa antibiotics      Current Facility-Administered Medications:   •  acetaminophen (TYLENOL) tablet 650 mg, 650 mg, Oral, Q6H PRN, Kashif Mosley MD  •  albuterol sulfate HFA (PROVENTIL HFA;VENTOLIN HFA;PROAIR HFA) inhaler 2 puff, 2 puff, Inhalation, Q4H PRN, Kashif Mosley MD  •  aluminum-magnesium hydroxide-simethicone (MAALOX MAX) 400-400-40 MG/5ML suspension 15 mL, 15 mL, Oral, Q6H PRN, Kashif Mosley MD  •  amoxicillin-clavulanate XR (AUGMENTIN XR) 1000-62.5 MG per 12 hr tablet 1 tablet, 1 tablet, Oral, Q12H, Omar Eddy MD, 1 tablet at 22 1139  •  benzonatate (TESSALON) capsule 100 mg, 100 mg, Oral, TID PRN, Kashif Mosley MD  •  benztropine (COGENTIN) tablet 2 mg, 2 mg, Oral, Once PRN **OR** benztropine (COGENTIN) injection 1 mg, 1 mg, Intramuscular, Once PRN, Kashif Mosley MD  •  buprenorphine-naloxone (SUBOXONE) 8-2 MG per SL tablet 1.75 tablet, 1.75 tablet, Sublingual, Daily, Kashif Mosley MD, 1.75 tablet at 22 08  •  cetirizine (zyrTEC) tablet 10 mg, 10 mg, Oral, Daily, Kashif Mosley MD, 10 mg at 22 08  •  [] cloNIDine (CATAPRES) tablet 0.1 mg, 0.1 mg, Oral, 4x Daily PRN **FOLLOWED BY** cloNIDine (CATAPRES) tablet 0.1 mg, 0.1 mg, Oral, TID PRN **FOLLOWED BY** [START ON 7/3/2022] cloNIDine (CATAPRES) tablet 0.1 mg, 0.1 mg, Oral, BID PRN **FOLLOWED BY** [START ON 2022] cloNIDine (CATAPRES) tablet 0.1 mg,  0.1 mg, Oral, Daily PRN, Ksahif Mosley MD  •  cyclobenzaprine (FLEXERIL) tablet 10 mg, 10 mg, Oral, TID PRN, Kashif Mosley MD  •  dicyclomine (BENTYL) capsule 10 mg, 10 mg, Oral, TID PRN, Kashif Mosley MD  •  famotidine (PEPCID) tablet 20 mg, 20 mg, Oral, BID PRN, Kashif Mosley MD  •  FLUoxetine (PROzac) capsule 40 mg, 40 mg, Oral, Daily, Kashif Mosley MD, 40 mg at 22 0828  •  hydrOXYzine (ATARAX) tablet 50 mg, 50 mg, Oral, Q6H PRN, Kashif Mosley MD, 50 mg at 22 2131  •  ibuprofen (ADVIL,MOTRIN) tablet 400 mg, 400 mg, Oral, Q6H PRN, Kashif Mosley MD, 400 mg at 22 1431  •  loperamide (IMODIUM) capsule 2 mg, 2 mg, Oral, Q2H PRN, Kashif Mosley MD  •  [COMPLETED] LORazepam (ATIVAN) tablet 2 mg, 2 mg, Oral, 3 times per day, 2 mg at 22 **FOLLOWED BY** LORazepam (ATIVAN) tablet 1.5 mg, 1.5 mg, Oral, 3 times per day, 1.5 mg at 22 1444 **FOLLOWED BY** [START ON 7/3/2022] LORazepam (ATIVAN) tablet 1 mg, 1 mg, Oral, 3 times per day **FOLLOWED BY** [START ON 2022] LORazepam (ATIVAN) tablet 0.5 mg, 0.5 mg, Oral, 3 times per day, Kashif Mosley MD  •  [] LORazepam (ATIVAN) tablet 2 mg, 2 mg, Oral, Q4H PRN **FOLLOWED BY** LORazepam (ATIVAN) tablet 1.5 mg, 1.5 mg, Oral, Q4H PRN **FOLLOWED BY** [START ON 7/3/2022] LORazepam (ATIVAN) tablet 1 mg, 1 mg, Oral, Q4H PRN **FOLLOWED BY** [START ON 2022] LORazepam (ATIVAN) tablet 0.5 mg, 0.5 mg, Oral, Q4H PRN, Kashif Mosley MD  •  magnesium hydroxide (MILK OF MAGNESIA) suspension 10 mL, 10 mL, Oral, Daily PRN, Kashif Mosley MD  •  nicotine (NICODERM CQ) 21 MG/24HR patch 1 patch, 1 patch, Transdermal, Q24H, Kashif Mosley MD  •  ondansetron (ZOFRAN) tablet 4 mg, 4 mg, Oral, Q6H PRN, Kashif Mosley MD, 4 mg at 22 2214  •  pantoprazole (PROTONIX) EC tablet 40 mg, 40 mg, Oral, Q AM, Kashif Mosley MD, 40 mg at 22 0614  •  sodium chloride nasal spray 2 spray, 2 spray, Each Nare, PRN,  Kashif Mosley MD  •  traZODone (DESYREL) tablet 50 mg, 50 mg, Oral, Nightly PRN, Kashif Mosley MD, 50 mg at 07/01/22 2131    Reviewed chart, notes, vitals, labs and EKG personally reviewed.    ASSESSMENT & PLAN:      Benzodiazepine dependence and withdrawal     Given the high risk of  morbidity and/or mortality associated with withdrawal from benzodiazepines, patient has been admitted for a medical detox.  Patient has been started on the appropriate detox regimen and vitals are being monitored appropriately. Routine labs have been ordered.  Patient will be assigned a Masters level therapist and encouraged to participate in both individual and group chemical dependency counseling on the unit.   -Continue Ativan Detox protocol  -Encourage cessation and substance abuse treatment at discharge     CODE STATUS: Full Code          Opiate Dependence, on MAT  - Continue Suboxone 16mg Daily  - CODY reviewed.   - Educated patient RE: the option of Sublocaid.    - Continuing the Suboxone will be very much operating under a harm reduction model given the fact that patient has been using other substances.  My recommendation on discharge will be to consider Sublocaid and/or changing to Vivitrol given the risk for diversion and or theft of Suboxone by drug using peers in the setting of use of other substances.     Unspecified Depressive Disorder  - prozac 40mg Daily     GERD  - continue PPI     Hep C  - Followup as outpatient     URI  - no purulent drainage but worsening ear pain, irritation, and sore throat this morning  - Start Augmentin ER 1000 mg twice daily for 7 days    Special precautions: Special Precautions Level 4 (q30 min checks)          Behavioral Health Treatment Plan and Problem List: I have reviewed and approved the Behavioral Health Treatment Plan and Problem list.  The patient has had a chance to review and agrees with the treatment plan.     Clinician:  Omar Eddy MD  07/02/22  14:45 EDT

## 2022-07-02 NOTE — PLAN OF CARE
Goal Outcome Evaluation:  Plan of Care Reviewed With: patient  Patient Agreement with Plan of Care: agrees        Outcome Evaluation: Patient rated anxiety 1/10, depression 10/10.  Was appropriate with staff and peers.  Slept through the night.

## 2022-07-02 NOTE — PLAN OF CARE
Problem: Adult Behavioral Health Plan of Care  Goal: Plan of Care Review  Outcome: Ongoing, Progressing  Flowsheets  Taken 7/2/2022 1555  Progress: improving  Plan of Care Reviewed With: patient  Patient Agreement with Plan of Care: agrees  Outcome Evaluation: pt pleasant and cooperative.  Taken 7/2/2022 0837  Plan of Care Reviewed With: patient  Patient Agreement with Plan of Care: agrees   Goal Outcome Evaluation:  Plan of Care Reviewed With: patient  Patient Agreement with Plan of Care: agrees     Progress: improving  Outcome Evaluation: pt pleasant and cooperative.

## 2022-07-03 PROCEDURE — 99232 SBSQ HOSP IP/OBS MODERATE 35: CPT | Performed by: PSYCHIATRY & NEUROLOGY

## 2022-07-03 RX ADMIN — AMOXICILLIN AND CLAVULANATE POTASSIUM 1 TABLET: 562.5; 437.5; 62.5 TABLET, MULTILAYER, EXTENDED RELEASE ORAL at 08:38

## 2022-07-03 RX ADMIN — LORAZEPAM 1 MG: 1 TABLET ORAL at 21:19

## 2022-07-03 RX ADMIN — LORAZEPAM 1 MG: 1 TABLET ORAL at 08:39

## 2022-07-03 RX ADMIN — IBUPROFEN 400 MG: 400 TABLET, FILM COATED ORAL at 11:06

## 2022-07-03 RX ADMIN — TRAZODONE HYDROCHLORIDE 50 MG: 50 TABLET ORAL at 21:19

## 2022-07-03 RX ADMIN — AMOXICILLIN AND CLAVULANATE POTASSIUM 1 TABLET: 562.5; 437.5; 62.5 TABLET, MULTILAYER, EXTENDED RELEASE ORAL at 21:19

## 2022-07-03 RX ADMIN — LORAZEPAM 1 MG: 1 TABLET ORAL at 14:38

## 2022-07-03 RX ADMIN — PANTOPRAZOLE SODIUM 40 MG: 40 TABLET, DELAYED RELEASE ORAL at 06:19

## 2022-07-03 RX ADMIN — FLUOXETINE HYDROCHLORIDE 40 MG: 20 CAPSULE ORAL at 08:39

## 2022-07-03 RX ADMIN — HYDROXYZINE HYDROCHLORIDE 50 MG: 50 TABLET ORAL at 08:40

## 2022-07-03 RX ADMIN — CETIRIZINE HYDROCHLORIDE 10 MG: 10 TABLET, FILM COATED ORAL at 08:38

## 2022-07-03 RX ADMIN — BUPRENORPHINE HYDROCHLORIDE AND NALOXONE HYDROCHLORIDE DIHYDRATE 1.75 TABLET: 8; 2 TABLET SUBLINGUAL at 08:40

## 2022-07-03 NOTE — PLAN OF CARE
Problem: Adult Behavioral Health Plan of Care  Goal: Plan of Care Review  7/3/2022 1448 by Candelaria March RN  Outcome: Ongoing, Progressing  Flowsheets  Taken 7/3/2022 1448  Progress: improving  Plan of Care Reviewed With: patient  Patient Agreement with Plan of Care: agrees  Outcome Evaluation: pt pleasant and cooperative.  Taken 7/3/2022 0945  Plan of Care Reviewed With: patient  Patient Agreement with Plan of Care: agrees   Goal Outcome Evaluation:  Plan of Care Reviewed With: patient  Patient Agreement with Plan of Care: agrees     Progress: improving  Outcome Evaluation: pt pleasant and cooperative.

## 2022-07-03 NOTE — PLAN OF CARE
Problem: Adult Behavioral Health Plan of Care  Goal: Plan of Care Review  Outcome: Ongoing, Progressing  Flowsheets (Taken 7/3/2022 6630)  Progress: improving  Plan of Care Reviewed With: patient  Patient Agreement with Plan of Care: agrees  Outcome Evaluation: pt pleasant and cooperative.Interacts well with staff and peers.   Goal Outcome Evaluation:  Plan of Care Reviewed With: patient  Patient Agreement with Plan of Care: agrees     Progress: improving  Outcome Evaluation: pt pleasant and cooperative.Interacts well with staff and peers.

## 2022-07-03 NOTE — PLAN OF CARE
Goal Outcome Evaluation:  Plan of Care Reviewed With: patient  Patient Agreement with Plan of Care: agrees     Progress: improving  Outcome Evaluation: Pt cooperative, slept throughout the night.

## 2022-07-03 NOTE — PROGRESS NOTES
"INPATIENT PSYCHIATRIC PROGRESS NOTE    Name:  Juanita Barnett  :  1980  MRN:  [id number]  Visit Number:  [id number]  Length of stay:  3    SUBJECTIVE    CC/Focus of Exam: Polysubstance Abuse and withdrawal    INTERVAL HISTORY: Patient having improved withdrawal symptoms.  She endorses some anxiety and states that she is having some minor leg cramps, headache, and nausea but does feel improved.  Despite the improvement withdrawal symptoms, she is having some worse cravings today.  I suspect anxiety was related to taper progressing.    Depression rating 4/10  Anxiety rating 6/10  Sleep: sleeping well  Withdrawal sx: See above  Cravin/10    Review of Systems   Constitutional: Positive for activity change and fatigue.   HENT: Positive for ear pain and sore throat.    Respiratory: Negative.    Cardiovascular: Negative.    Gastrointestinal: Positive for nausea.   Musculoskeletal: Positive for myalgias.   Neurological: Positive for headaches.   Psychiatric/Behavioral: Positive for dysphoric mood. Negative for sleep disturbance.       OBJECTIVE    Temp:  [97.7 °F (36.5 °C)-98.5 °F (36.9 °C)] 97.8 °F (36.6 °C)  Heart Rate:  [60-67] 60  Resp:  [18-20] 18  BP: ()/(62-90) 99/64    MENTAL STATUS EXAM:  Appearance: Casually dressed, good hygeine.   Cooperation: Cooperative  Psychomotor: No psychomotor agitation/retardation, No EPS, No motor tics  Speech: normal rate, amount.  Mood: \"Anxious\" - mood improving   Affect: congruent, appropriate  Thought Content: goal directed, no delusional material present  Thought process: linear, organized.  Suicidality: No SI  Homicidality: No HI  Perception: No AH/VH  Insight: fair   Judgment: fair    Lab Results (last 24 hours)     ** No results found for the last 24 hours. **             Imaging Results (Last 24 Hours)     ** No results found for the last 24 hours. **             ECG/EMG Results (most recent)     Procedure Component Value Units Date/Time    ECG 12 " Lead [[id number]] Collected: 22     Updated: 22 1100     QT Interval 410 ms      QTC Interval 399 ms     Narrative:      Test Reason : Baseline Cardiac Status  Blood Pressure :   */*   mmHG  Vent. Rate :  57 BPM     Atrial Rate :  57 BPM     P-R Int : 182 ms          QRS Dur :  88 ms      QT Int : 410 ms       P-R-T Axes :  65  58  58 degrees     QTc Int : 399 ms    Sinus bradycardia  Otherwise normal ECG  No previous ECGs available  Confirmed by Molina Marks () on 2022 11:00:42 AM    Referred By:            Confirmed By: Molina Marks           ALLERGIES: Sulfa antibiotics      Current Facility-Administered Medications:   •  acetaminophen (TYLENOL) tablet 650 mg, 650 mg, Oral, Q6H PRN, Kashif Mosley MD  •  albuterol sulfate HFA (PROVENTIL HFA;VENTOLIN HFA;PROAIR HFA) inhaler 2 puff, 2 puff, Inhalation, Q4H PRN, Kashif Mosley MD  •  aluminum-magnesium hydroxide-simethicone (MAALOX MAX) 400-400-40 MG/5ML suspension 15 mL, 15 mL, Oral, Q6H PRN, Kashif Mosley MD  •  amoxicillin-clavulanate XR (AUGMENTIN XR) 1000-62.5 MG per 12 hr tablet 1 tablet, 1 tablet, Oral, Q12H, Omar Eddy MD, 1 tablet at 22 0838  •  benzonatate (TESSALON) capsule 100 mg, 100 mg, Oral, TID PRN, Kashif Mosley MD  •  benztropine (COGENTIN) tablet 2 mg, 2 mg, Oral, Once PRN **OR** benztropine (COGENTIN) injection 1 mg, 1 mg, Intramuscular, Once PRN, Kashif Mosley MD  •  buprenorphine-naloxone (SUBOXONE) 8-2 MG per SL tablet 1.75 tablet, 1.75 tablet, Sublingual, Daily, Kashif Mosley MD, 1.75 tablet at 22 0840  •  cetirizine (zyrTEC) tablet 10 mg, 10 mg, Oral, Daily, Kashif Mosley MD, 10 mg at 22 0838  •  [] cloNIDine (CATAPRES) tablet 0.1 mg, 0.1 mg, Oral, 4x Daily PRN **FOLLOWED BY** [] cloNIDine (CATAPRES) tablet 0.1 mg, 0.1 mg, Oral, TID PRN **FOLLOWED BY** cloNIDine (CATAPRES) tablet 0.1 mg, 0.1 mg, Oral, BID PRN **FOLLOWED BY** [START ON 2022]  cloNIDine (CATAPRES) tablet 0.1 mg, 0.1 mg, Oral, Daily PRN, Kashif Mosley MD  •  cyclobenzaprine (FLEXERIL) tablet 10 mg, 10 mg, Oral, TID PRN, Kashif Mosley MD  •  dicyclomine (BENTYL) capsule 10 mg, 10 mg, Oral, TID PRN, Kashif Mosley MD  •  famotidine (PEPCID) tablet 20 mg, 20 mg, Oral, BID PRN, Kashif Mosley MD  •  FLUoxetine (PROzac) capsule 40 mg, 40 mg, Oral, Daily, Kashif Mosley MD, 40 mg at 22 0839  •  hydrOXYzine (ATARAX) tablet 50 mg, 50 mg, Oral, Q6H PRN, Kashif Mosley MD, 50 mg at 22 0840  •  ibuprofen (ADVIL,MOTRIN) tablet 400 mg, 400 mg, Oral, Q6H PRN, Kashif Mosley MD, 400 mg at 22 1106  •  loperamide (IMODIUM) capsule 2 mg, 2 mg, Oral, Q2H PRN, Kashif Mosley MD  •  [COMPLETED] LORazepam (ATIVAN) tablet 2 mg, 2 mg, Oral, 3 times per day, 2 mg at 22 **FOLLOWED BY** [COMPLETED] LORazepam (ATIVAN) tablet 1.5 mg, 1.5 mg, Oral, 3 times per day, 1.5 mg at 22 **FOLLOWED BY** LORazepam (ATIVAN) tablet 1 mg, 1 mg, Oral, 3 times per day, 1 mg at 22 1438 **FOLLOWED BY** [START ON 2022] LORazepam (ATIVAN) tablet 0.5 mg, 0.5 mg, Oral, 3 times per day, Kashif Mosley MD  •  [] LORazepam (ATIVAN) tablet 2 mg, 2 mg, Oral, Q4H PRN **FOLLOWED BY** [] LORazepam (ATIVAN) tablet 1.5 mg, 1.5 mg, Oral, Q4H PRN **FOLLOWED BY** LORazepam (ATIVAN) tablet 1 mg, 1 mg, Oral, Q4H PRN **FOLLOWED BY** [START ON 2022] LORazepam (ATIVAN) tablet 0.5 mg, 0.5 mg, Oral, Q4H PRN, Kashif Mosley MD  •  magnesium hydroxide (MILK OF MAGNESIA) suspension 10 mL, 10 mL, Oral, Daily PRN, Kashif Mosley MD  •  nicotine (NICODERM CQ) 21 MG/24HR patch 1 patch, 1 patch, Transdermal, Q24H, Kashif Mosley MD  •  ondansetron (ZOFRAN) tablet 4 mg, 4 mg, Oral, Q6H PRN, Kashif Mosley MD, 4 mg at 22 2214  •  pantoprazole (PROTONIX) EC tablet 40 mg, 40 mg, Oral, Q AM, Kashif Mosley MD, 40 mg at 22 0619  •  sodium chloride  nasal spray 2 spray, 2 spray, Each Nare, PRN, Kashif Mosley MD  •  traZODone (DESYREL) tablet 50 mg, 50 mg, Oral, Nightly PRN, Kashif Mosley MD, 50 mg at 07/02/22 2129    Reviewed chart, notes, vitals, labs and EKG personally reviewed.    ASSESSMENT & PLAN:      Benzodiazepine dependence and withdrawal     Given the high risk of  morbidity and/or mortality associated with withdrawal from benzodiazepines, patient has been admitted for a medical detox.  Patient has been started on the appropriate detox regimen and vitals are being monitored appropriately. Routine labs have been ordered.  Patient will be assigned a Masters level therapist and encouraged to participate in both individual and group chemical dependency counseling on the unit.   -Continue Ativan Detox protocol  -Encourage cessation and substance abuse treatment at discharge     CODE STATUS: Full Code          Opiate Dependence, on MAT  - Continue Suboxone 16mg Daily  - CODY reviewed.   - Educated patient RE: the option of Sublocaid.    - Continuing the Suboxone will be very much operating under a harm reduction model given the fact that patient has been using other substances.  My recommendation on discharge will be to consider Sublocaid and/or changing to Vivitrol given the risk for diversion and or theft of Suboxone by drug using peers in the setting of use of other substances.     Unspecified Depressive Disorder  - prozac 40mg Daily     GERD  - continue PPI     Hep C  - Followup as outpatient     URI  - no purulent drainage but worsening ear pain, irritation, and sore throat this morning  - Started Augmentin ER 1000 mg twice daily for 7 days 7/2/22    Special precautions: Special Precautions Level 4 (q30 min checks)          Behavioral Health Treatment Plan and Problem List: I have reviewed and approved the Behavioral Health Treatment Plan and Problem list.  The patient has had a chance to review and agrees with the treatment plan.      Clinician:  Omar Eddy MD  07/03/22  14:53 EDT

## 2022-07-04 PROCEDURE — 63710000001 ONDANSETRON PER 8 MG: Performed by: PSYCHIATRY & NEUROLOGY

## 2022-07-04 PROCEDURE — 99232 SBSQ HOSP IP/OBS MODERATE 35: CPT | Performed by: PSYCHIATRY & NEUROLOGY

## 2022-07-04 RX ORDER — CHLORDIAZEPOXIDE HYDROCHLORIDE 25 MG/1
25 CAPSULE, GELATIN COATED ORAL 2 TIMES DAILY
Status: COMPLETED | OUTPATIENT
Start: 2022-07-04 | End: 2022-07-04

## 2022-07-04 RX ADMIN — IBUPROFEN 400 MG: 400 TABLET, FILM COATED ORAL at 20:08

## 2022-07-04 RX ADMIN — LORAZEPAM 0.5 MG: 0.5 TABLET ORAL at 08:02

## 2022-07-04 RX ADMIN — BUPRENORPHINE HYDROCHLORIDE AND NALOXONE HYDROCHLORIDE DIHYDRATE 1.75 TABLET: 8; 2 TABLET SUBLINGUAL at 08:01

## 2022-07-04 RX ADMIN — CHLORDIAZEPOXIDE HYDROCHLORIDE 25 MG: 25 CAPSULE ORAL at 13:42

## 2022-07-04 RX ADMIN — ONDANSETRON HYDROCHLORIDE 4 MG: 4 TABLET, FILM COATED ORAL at 20:07

## 2022-07-04 RX ADMIN — AMOXICILLIN AND CLAVULANATE POTASSIUM 1 TABLET: 562.5; 437.5; 62.5 TABLET, MULTILAYER, EXTENDED RELEASE ORAL at 21:12

## 2022-07-04 RX ADMIN — CHLORDIAZEPOXIDE HYDROCHLORIDE 25 MG: 25 CAPSULE ORAL at 21:12

## 2022-07-04 RX ADMIN — IBUPROFEN 400 MG: 400 TABLET, FILM COATED ORAL at 10:48

## 2022-07-04 RX ADMIN — AMOXICILLIN AND CLAVULANATE POTASSIUM 1 TABLET: 562.5; 437.5; 62.5 TABLET, MULTILAYER, EXTENDED RELEASE ORAL at 08:01

## 2022-07-04 RX ADMIN — LORAZEPAM 0.5 MG: 0.5 TABLET ORAL at 14:00

## 2022-07-04 RX ADMIN — LORAZEPAM 0.5 MG: 0.5 TABLET ORAL at 21:12

## 2022-07-04 RX ADMIN — TRAZODONE HYDROCHLORIDE 50 MG: 50 TABLET ORAL at 21:12

## 2022-07-04 RX ADMIN — FLUOXETINE HYDROCHLORIDE 40 MG: 20 CAPSULE ORAL at 08:01

## 2022-07-04 RX ADMIN — PANTOPRAZOLE SODIUM 40 MG: 40 TABLET, DELAYED RELEASE ORAL at 06:14

## 2022-07-04 RX ADMIN — CETIRIZINE HYDROCHLORIDE 10 MG: 10 TABLET, FILM COATED ORAL at 08:01

## 2022-07-04 NOTE — PLAN OF CARE
Goal Outcome Evaluation:  Plan of Care Reviewed With: patient  Patient Agreement with Plan of Care: agrees     Progress: improving  Outcome Evaluation: Pt more outgoing today and interacting with her peers more. Pt slept throughout the night.   V-Y Flap Text: The defect edges were debeveled with a #15 scalpel blade.  Given the location of the defect, shape of the defect and the proximity to free margins a V-Y flap was deemed most appropriate.  Using a sterile surgical marker, an appropriate advancement flap was drawn incorporating the defect and placing the expected incisions within the relaxed skin tension lines where possible.    The area thus outlined was incised deep to adipose tissue with a #15 scalpel blade.  The skin margins were undermined to an appropriate distance in all directions utilizing iris scissors.

## 2022-07-04 NOTE — PLAN OF CARE
Problem: Adult Behavioral Health Plan of Care  Goal: Plan of Care Review  Outcome: Ongoing, Progressing  Flowsheets (Taken 7/4/2022 8007)  Progress: improving  Plan of Care Reviewed With: patient  Patient Agreement with Plan of Care: agrees  Outcome Evaluation: Pt pleasant and cooperative.Interacting well with peers and staff.   Goal Outcome Evaluation:  Plan of Care Reviewed With: patient  Patient Agreement with Plan of Care: agrees     Progress: improving  Outcome Evaluation: Pt pleasant and cooperative.Interacting well with peers and staff.

## 2022-07-04 NOTE — PROGRESS NOTES
Detox Recovery Unit Progress Note   Clinician: Kashif Mosley MD  Admission Date: 6/30/2022  12:20 EDT 07/04/22    Behavioral Health Treatment Plan and Problem List: I have reviewed and approved the Behavioral Health Treatment Plan and Problem list.    Allergies  Allergies   Allergen Reactions   • Sulfa Antibiotics Nausea And Vomiting       Hospital Day: 4 days      Assessment completed within view of staff    History  CC: withdrawal symptoms    Interval HPI: Patient seen and evaluated by me.  Chart reviewed. Patient is withdrawing from benzodiazepines  Current withdrawal symptoms include: anxiety, tremor, body aches, malaise    ROS otherwise as below.        Interval Review of Systems:   General ROS: negative for - fever.  Positive for withdrawal symptoms mentioned above.  Endocrine ROS: negative for - palpitations  Respiratory ROS: no cough, shortness of breath, or wheezing  Cardiovascular ROS: no chest pain or dyspnea on exertion  Gastrointestinal ROS: no abdominal pain,no black or bloody stools    /75 (BP Location: Right arm, Patient Position: Sitting)   Pulse 65   Temp 97.9 °F (36.6 °C) (Temporal)   Resp 16   SpO2 94%     Mental Status Exam  Mood: anxiety  Affect: mood congruent  Thought Processes: linear  Thought Content: No Delusions voiced  Hallucinations: Denies  Suicidal Thoughts: Denies  Suicidal Plan/Intent: Denies  Hopelesness: Moderate          Medical Decision Making:   Labs:     Lab Results (last 24 hours)     ** No results found for the last 24 hours. **            Radiology:     Imaging Results (Last 24 Hours)     ** No results found for the last 24 hours. **            EKG:     ECG/EMG Results (most recent)     Procedure Component Value Units Date/Time    ECG 12 Lead [[id number]] Collected: 06/30/22 2357     Updated: 07/01/22 1100     QT Interval 410 ms      QTC Interval 399 ms     Narrative:      Test Reason : Baseline Cardiac Status  Blood Pressure :   */*   mmHG  Vent. Rate :  57  BPM     Atrial Rate :  57 BPM     P-R Int : 182 ms          QRS Dur :  88 ms      QT Int : 410 ms       P-R-T Axes :  65  58  58 degrees     QTc Int : 399 ms    Sinus bradycardia  Otherwise normal ECG  No previous ECGs available  Confirmed by Molina Marks ([id number]) on 7/1/2022 11:00:42 AM    Referred By:            Confirmed By: Molina Marks           Medications:  amoxicillin-clavulanate XR, 1 tablet, Oral, Q12H  buprenorphine-naloxone, 1.75 tablet, Sublingual, Daily  cetirizine, 10 mg, Oral, Daily  FLUoxetine, 40 mg, Oral, Daily  LORazepam, 0.5 mg, Oral, 3 times per day  nicotine, 1 patch, Transdermal, Q24H  pantoprazole, 40 mg, Oral, Q AM           All medications reviewed.      Assessment and Plan:  Benzodiazepine dependence and withdrawal  -Continue Ativan Detox protocol  - Add Librium 25 mg twice a day today only to help with withdrawal Sx.  The medication will self-taper.  -Encourage cessation and substance abuse treatment at discharge     Opiate Dependence, on MAT  - Continue Suboxone 16mg Daily  - CODY reviewed.   - Educated patient RE: the option of Sublocaid.    - Continuing the Suboxone will be very much operating under a harm reduction model given the fact that patient has been using other substances.  My recommendation on discharge will be to consider Sublocaid and/or changing to Vivitrol given the risk for diversion and or theft of Suboxone by drug using peers in the setting of use of other substances.     Unspecified Depressive Disorder  - Patient reports she was taking prozac 80mg at home, not 40.  Will increase to 80mg.     GERD  - continue PPI     Hep C  - Followup as outpatient     URI  - Started Augmentin ER 1000 mg twice daily for 7 days 7/2/22         Continue hospitalization for medical management of drug withdrawal and patient safety and stabilization.  Continue individual and group chemical dependency counseling.   Therapist and treatment team will continue to assist patient in  establishing plans for follow-up and rehabilitation that will serve as the next step in care once patient has completed the medical detox.

## 2022-07-05 VITALS
OXYGEN SATURATION: 96 % | HEART RATE: 70 BPM | TEMPERATURE: 98.1 F | RESPIRATION RATE: 18 BRPM | DIASTOLIC BLOOD PRESSURE: 64 MMHG | SYSTOLIC BLOOD PRESSURE: 115 MMHG

## 2022-07-05 PROCEDURE — 99239 HOSP IP/OBS DSCHRG MGMT >30: CPT | Performed by: PSYCHIATRY & NEUROLOGY

## 2022-07-05 RX ORDER — BUPRENORPHINE HYDROCHLORIDE AND NALOXONE HYDROCHLORIDE DIHYDRATE 8; 2 MG/1; MG/1
1.75 TABLET SUBLINGUAL DAILY
Qty: 12 TABLET | Refills: 0 | Status: SHIPPED | OUTPATIENT
Start: 2022-07-05

## 2022-07-05 RX ORDER — FLUOXETINE HYDROCHLORIDE 40 MG/1
80 CAPSULE ORAL DAILY
Qty: 60 CAPSULE | Refills: 0 | Status: SHIPPED | OUTPATIENT
Start: 2022-07-05

## 2022-07-05 RX ORDER — OMEPRAZOLE 20 MG/1
20 CAPSULE, DELAYED RELEASE ORAL DAILY
Qty: 30 CAPSULE | Refills: 0 | Status: SHIPPED | OUTPATIENT
Start: 2022-07-05

## 2022-07-05 RX ORDER — AMOXICILLIN AND CLAVULANATE POTASSIUM 562.5; 437.5; 62.5 MG/1; MG/1; MG/1
1 TABLET, FILM COATED, EXTENDED RELEASE ORAL EVERY 12 HOURS SCHEDULED
Qty: 7 TABLET | Refills: 0 | Status: SHIPPED | OUTPATIENT
Start: 2022-07-05 | End: 2022-07-09

## 2022-07-05 RX ORDER — CETIRIZINE HYDROCHLORIDE 10 MG/1
10 TABLET ORAL DAILY
Qty: 30 TABLET | Refills: 0 | Status: SHIPPED | OUTPATIENT
Start: 2022-07-05

## 2022-07-05 RX ORDER — ALBUTEROL SULFATE 90 UG/1
2 AEROSOL, METERED RESPIRATORY (INHALATION) EVERY 4 HOURS PRN
Qty: 8.5 G | Refills: 0 | Status: SHIPPED | OUTPATIENT
Start: 2022-07-05

## 2022-07-05 RX ADMIN — PANTOPRAZOLE SODIUM 40 MG: 40 TABLET, DELAYED RELEASE ORAL at 06:18

## 2022-07-05 RX ADMIN — HYDROXYZINE HYDROCHLORIDE 50 MG: 50 TABLET ORAL at 08:08

## 2022-07-05 RX ADMIN — CETIRIZINE HYDROCHLORIDE 10 MG: 10 TABLET, FILM COATED ORAL at 08:07

## 2022-07-05 RX ADMIN — BUPRENORPHINE HYDROCHLORIDE AND NALOXONE HYDROCHLORIDE DIHYDRATE 1.75 TABLET: 8; 2 TABLET SUBLINGUAL at 08:08

## 2022-07-05 RX ADMIN — AMOXICILLIN AND CLAVULANATE POTASSIUM 1 TABLET: 562.5; 437.5; 62.5 TABLET, MULTILAYER, EXTENDED RELEASE ORAL at 08:07

## 2022-07-05 RX ADMIN — FLUOXETINE HYDROCHLORIDE 40 MG: 20 CAPSULE ORAL at 08:08

## 2022-07-05 NOTE — DISCHARGE SUMMARY
"      PSYCHIATRIC DISCHARGE SUMMARY     Patient Identification:  Name:  Juanita Barnett  Age:  42 y.o.  Sex:  female  :  1980  MRN:  [id number]  Visit Number:  [id number]    Date of Admission:2022   Date of Discharge:   2022    Discharge Diagnosis:  Active Problems:    Opiate dependence (HCC)      Admission Diagnosis:  Opiate dependence (HCC) [F11.20]     Hospital Course  Patient is a 42 y.o. female with a history of hepatitis C, depression, opiate dependence who presented with benzodiazepine abuse.  Admitted for medically assisted detox.  Admission UDS positive for benzodiazepines, buprenorphine, methamphetamine, THC.  Placed on Ativan detox protocol.  Continued on home medications.  Librium was later added for breakthrough symptoms of alcohol withdrawal.  Patient tolerated detox appropriately.  Augmentin was started for URI.  Patient accepted a sober living facility, to which she will be discharged today.  No acutely concerning symptoms requiring hospitalization present today.  Outpatient care ascertained.    On the day of discharge, patient denied SI, HI or AVH. Patient was stable and appropriate by the conclusion of this admission, denying significant symptoms of mood, psychotic or thought disorder. Patient showed improvement of presenting symptoms and was deemed appropriate for discharge today.    Mental Status Exam upon discharge:   Mood \"better\"   Affect-congruent, appropriate, stable  Thought Content-goal directed, no delusional material present  Thought process-linear, organized.  Suicidality: No SI  Homicidality: No HI  Perception: No AH/VH    Procedures Performed         Consults:   Consults     No orders found from 2022 to 2022.          Pertinent Test Results:   Lab Results (last 7 days)     Procedure Component Value Units Date/Time    Hepatitis Panel, Acute [[id number]]  (Abnormal) Collected: 2216    Specimen: Blood Updated: 22 0653     Hepatitis B " Surface Ag Non-Reactive     Hep A IgM Non-Reactive     Hep B C IgM Non-Reactive     Hepatitis C Ab Reactive    Narrative:      Results may be falsely decreased if patient taking Biotin.     HIV-1 / O / 2 Ag / Antibody 4th Generation [[id number]]  (Normal) Collected: 07/01/22 0516    Specimen: Blood Updated: 07/01/22 0636     HIV-1/ HIV-2 Non-Reactive     Comment: A non-reactive test result does not preclude the possibility of exposure to HIV or infection with HIV. An antibody response to recent exposure may take several months to reach detectable levels.       Narrative:      The HIV antibody/antigen combo assay is a qualitative assay for HIV that includes the p24 antigen as well as antibodies to HIV types 1 and 2. This test is intended to be used as a screening assay in the diagnosis of HIV infection in patients over the age of 2.          Condition on Discharge:  improved    Vital Signs  Temp:  [97.4 °F (36.3 °C)-98.3 °F (36.8 °C)] 97.4 °F (36.3 °C)  Heart Rate:  [65-69] 65  Resp:  [18-20] 18  BP: (104-124)/(61-78) 110/61    Discharge Disposition:  Home or Self Care    Discharge Medications:     Discharge Medications      New Medications      Instructions Start Date   amoxicillin-clavulanate XR 1000-62.5 MG per 12 hr tablet  Commonly known as: AUGMENTIN XR   1 tablet, Oral, Every 12 Hours Scheduled         Continue These Medications      Instructions Start Date   albuterol sulfate  (90 Base) MCG/ACT inhaler  Commonly known as: PROVENTIL HFA;VENTOLIN HFA;PROAIR HFA   2 puffs, Inhalation, Every 4 Hours PRN      buprenorphine-naloxone 8-2 MG per SL tablet  Commonly known as: SUBOXONE   1.75 tablets, Sublingual, Daily      cetirizine 10 MG tablet  Commonly known as: zyrTEC   10 mg, Oral, Daily      FLUoxetine 40 MG capsule  Commonly known as: PROzac   80 mg, Oral, Daily      omeprazole 20 MG capsule  Commonly known as: priLOSEC   20 mg, Oral, Daily             Discharge Diet: Normal    Activity at Discharge:  Normal    Follow-up Appointments  No future appointments.      Test Results Pending at Discharge  None     Time: I spent greater than 30 minutes on this discharge activity which included: face-to-face encounter with the patient, reviewing the data in the system, coordination of the care with the nursing staff as well as consultants, documentation, and entering orders.      Clinician:   Luis Jernigan MD  07/05/22  11:07 EDT

## 2022-07-05 NOTE — DISCHARGE INSTR - APPOINTMENTS
[facility]  [place], [place]    [phone]    Returns at Discharge today 7/5/22 and her ride plans to be here at 2pm.

## 2022-07-05 NOTE — PLAN OF CARE
Goal Outcome Evaluation:  Plan of Care Reviewed With: patient  Patient Agreement with Plan of Care: agrees        Pt states anxiety 8, depression 9. She is calm and cooperative with staff, med compliant.

## 2022-07-05 NOTE — PROGRESS NOTES
Bridge Session  Date: 7/5/22   Time: 10:00-10:20    Data:  Reason for Inpatient Admission: Opioid and benzodiazepine dependence and acute withdrawal.     Follow up: [facility] in [place], [place] on 7/5/22.      Coping Skills to Utilize: Reading, art, journaling    Crisis Safety Plan:  • Support System to utilize and contact numbers: Mother [phone]    • Educated on crisis hotline numbers (yes/no): yes    • Was the Patient made aware of contact information for the following: community mental health centers, crisis stabilization programs, residential programs, , etc (yes/no): yes    Will transportation be a barrier (yes/no): yes  • If so, explain solution(s) to resolve barrier: [facility] Staff is providing transportation for this patient.     • How and where will the patient obtain prescribed medications: 1 week supply being filled by [facility] prior to discharge. No issues with obtaining needed medications.      Assessment: Patient has been anxious about leaving and somewhat restless, but otherwise is doing well. She has completed her detox and deemed medically stable and appropriate for transition to a lower level of care.     Plan:    Discussed the importance of follow up treatment for continuity of care. The Patient was able to verbalize understanding and commitment to the individualized aftercare and crisis safety plan.      Oswaldo Gauthier LCSW

## 2022-07-05 NOTE — PLAN OF CARE
Problem: Adult Behavioral Health Plan of Care  Goal: Plan of Care Review  Outcome: Adequate for Care Transition  Flowsheets  Taken 7/5/2022 1124  Progress: improving  Plan of Care Reviewed With: patient  Patient Agreement with Plan of Care: agrees  Outcome Evaluation: Pt adequate for DC. A&O x4  Taken 7/5/2022 0810  Plan of Care Reviewed With: patient  Patient Agreement with Plan of Care: agrees   Goal Outcome Evaluation:  Plan of Care Reviewed With: patient  Patient Agreement with Plan of Care: agrees     Progress: improving  Outcome Evaluation: Pt adequate for DC. A&O x4

## 2024-04-04 ENCOUNTER — HOSPITAL ENCOUNTER (OUTPATIENT)
Facility: HOSPITAL | Age: 44
Setting detail: OBSERVATION
Discharge: NURSING, REHAB OR LONG-TERM CARE | End: 2024-04-06
Attending: STUDENT IN AN ORGANIZED HEALTH CARE EDUCATION/TRAINING PROGRAM | Admitting: STUDENT IN AN ORGANIZED HEALTH CARE EDUCATION/TRAINING PROGRAM
Payer: COMMERCIAL

## 2024-04-04 ENCOUNTER — HOSPITAL ENCOUNTER (EMERGENCY)
Facility: HOSPITAL | Age: 44
Discharge: STILL ADMITTED OR ADMINISTRATIVE | End: 2024-04-04
Attending: STUDENT IN AN ORGANIZED HEALTH CARE EDUCATION/TRAINING PROGRAM
Payer: COMMERCIAL

## 2024-04-04 VITALS
TEMPERATURE: 97.8 F | WEIGHT: 200 LBS | HEIGHT: 66 IN | RESPIRATION RATE: 18 BRPM | BODY MASS INDEX: 32.14 KG/M2 | OXYGEN SATURATION: 97 % | HEART RATE: 75 BPM | DIASTOLIC BLOOD PRESSURE: 86 MMHG | SYSTOLIC BLOOD PRESSURE: 122 MMHG

## 2024-04-04 DIAGNOSIS — F13.10 BENZODIAZEPINE ABUSE: Primary | ICD-10-CM

## 2024-04-04 DIAGNOSIS — F13.930 BENZODIAZEPINE WITHDRAWAL WITHOUT COMPLICATION: ICD-10-CM

## 2024-04-04 PROBLEM — F19.20 CHEMICAL DEPENDENCY: Status: ACTIVE | Noted: 2024-04-04

## 2024-04-04 LAB
ALBUMIN SERPL-MCNC: 4.3 G/DL (ref 3.5–5.2)
ALBUMIN/GLOB SERPL: 1.4 G/DL
ALP SERPL-CCNC: 80 U/L (ref 39–117)
ALT SERPL W P-5'-P-CCNC: 9 U/L (ref 1–33)
AMPHET+METHAMPHET UR QL: NEGATIVE
AMPHETAMINES UR QL: NEGATIVE
ANION GAP SERPL CALCULATED.3IONS-SCNC: 11.4 MMOL/L (ref 5–15)
AST SERPL-CCNC: 16 U/L (ref 1–32)
BARBITURATES UR QL SCN: NEGATIVE
BASOPHILS # BLD AUTO: 0.04 10*3/MM3 (ref 0–0.2)
BASOPHILS NFR BLD AUTO: 0.5 % (ref 0–1.5)
BENZODIAZ UR QL SCN: POSITIVE
BILIRUB SERPL-MCNC: 0.3 MG/DL (ref 0–1.2)
BILIRUB UR QL STRIP: NEGATIVE
BUN SERPL-MCNC: 7 MG/DL (ref 6–20)
BUN/CREAT SERPL: 7.8 (ref 7–25)
BUPRENORPHINE SERPL-MCNC: POSITIVE NG/ML
CALCIUM SPEC-SCNC: 9.7 MG/DL (ref 8.6–10.5)
CANNABINOIDS SERPL QL: NEGATIVE
CHLORIDE SERPL-SCNC: 99 MMOL/L (ref 98–107)
CLARITY UR: CLEAR
CO2 SERPL-SCNC: 25.6 MMOL/L (ref 22–29)
COCAINE UR QL: NEGATIVE
COLOR UR: YELLOW
CREAT SERPL-MCNC: 0.9 MG/DL (ref 0.57–1)
DEPRECATED RDW RBC AUTO: 38.9 FL (ref 37–54)
EGFRCR SERPLBLD CKD-EPI 2021: 81 ML/MIN/1.73
EOSINOPHIL # BLD AUTO: 0.14 10*3/MM3 (ref 0–0.4)
EOSINOPHIL NFR BLD AUTO: 1.6 % (ref 0.3–6.2)
ERYTHROCYTE [DISTWIDTH] IN BLOOD BY AUTOMATED COUNT: 12.2 % (ref 12.3–15.4)
ETHANOL BLD-MCNC: <10 MG/DL (ref 0–10)
ETHANOL UR QL: <0.01 %
FENTANYL UR-MCNC: NEGATIVE NG/ML
GLOBULIN UR ELPH-MCNC: 3.1 GM/DL
GLUCOSE SERPL-MCNC: 108 MG/DL (ref 65–99)
GLUCOSE UR STRIP-MCNC: NEGATIVE MG/DL
HCT VFR BLD AUTO: 37.7 % (ref 34–46.6)
HGB BLD-MCNC: 12.8 G/DL (ref 12–15.9)
HGB UR QL STRIP.AUTO: NEGATIVE
HOLD SPECIMEN: NORMAL
HOLD SPECIMEN: NORMAL
IMM GRANULOCYTES # BLD AUTO: 0.02 10*3/MM3 (ref 0–0.05)
IMM GRANULOCYTES NFR BLD AUTO: 0.2 % (ref 0–0.5)
KETONES UR QL STRIP: NEGATIVE
LEUKOCYTE ESTERASE UR QL STRIP.AUTO: NEGATIVE
LYMPHOCYTES # BLD AUTO: 2.27 10*3/MM3 (ref 0.7–3.1)
LYMPHOCYTES NFR BLD AUTO: 25.9 % (ref 19.6–45.3)
MAGNESIUM SERPL-MCNC: 2 MG/DL (ref 1.6–2.6)
MCH RBC QN AUTO: 29.6 PG (ref 26.6–33)
MCHC RBC AUTO-ENTMCNC: 34 G/DL (ref 31.5–35.7)
MCV RBC AUTO: 87.3 FL (ref 79–97)
METHADONE UR QL SCN: NEGATIVE
MONOCYTES # BLD AUTO: 0.59 10*3/MM3 (ref 0.1–0.9)
MONOCYTES NFR BLD AUTO: 6.7 % (ref 5–12)
NEUTROPHILS NFR BLD AUTO: 5.7 10*3/MM3 (ref 1.7–7)
NEUTROPHILS NFR BLD AUTO: 65.1 % (ref 42.7–76)
NITRITE UR QL STRIP: NEGATIVE
NRBC BLD AUTO-RTO: 0 /100 WBC (ref 0–0.2)
OPIATES UR QL: NEGATIVE
OXYCODONE UR QL SCN: NEGATIVE
PCP UR QL SCN: NEGATIVE
PH UR STRIP.AUTO: 8 [PH] (ref 5–8)
PLATELET # BLD AUTO: 284 10*3/MM3 (ref 140–450)
PMV BLD AUTO: 10.3 FL (ref 6–12)
POTASSIUM SERPL-SCNC: 4.1 MMOL/L (ref 3.5–5.2)
PROT SERPL-MCNC: 7.4 G/DL (ref 6–8.5)
PROT UR QL STRIP: NEGATIVE
RBC # BLD AUTO: 4.32 10*6/MM3 (ref 3.77–5.28)
SODIUM SERPL-SCNC: 136 MMOL/L (ref 136–145)
SP GR UR STRIP: 1.01 (ref 1–1.03)
TRICYCLICS UR QL SCN: POSITIVE
UROBILINOGEN UR QL STRIP: NORMAL
WBC NRBC COR # BLD AUTO: 8.76 10*3/MM3 (ref 3.4–10.8)
WHOLE BLOOD HOLD COAG: NORMAL
WHOLE BLOOD HOLD SPECIMEN: NORMAL

## 2024-04-04 PROCEDURE — 93010 ELECTROCARDIOGRAM REPORT: CPT | Performed by: SPECIALIST

## 2024-04-04 PROCEDURE — 80307 DRUG TEST PRSMV CHEM ANLYZR: CPT | Performed by: PHYSICIAN ASSISTANT

## 2024-04-04 PROCEDURE — G0378 HOSPITAL OBSERVATION PER HR: HCPCS

## 2024-04-04 PROCEDURE — 83735 ASSAY OF MAGNESIUM: CPT | Performed by: PHYSICIAN ASSISTANT

## 2024-04-04 PROCEDURE — 36415 COLL VENOUS BLD VENIPUNCTURE: CPT

## 2024-04-04 PROCEDURE — 93005 ELECTROCARDIOGRAM TRACING: CPT | Performed by: EMERGENCY MEDICINE

## 2024-04-04 PROCEDURE — 81003 URINALYSIS AUTO W/O SCOPE: CPT | Performed by: PHYSICIAN ASSISTANT

## 2024-04-04 PROCEDURE — 99285 EMERGENCY DEPT VISIT HI MDM: CPT

## 2024-04-04 PROCEDURE — 82077 ASSAY SPEC XCP UR&BREATH IA: CPT | Performed by: PHYSICIAN ASSISTANT

## 2024-04-04 PROCEDURE — 80053 COMPREHEN METABOLIC PANEL: CPT | Performed by: PHYSICIAN ASSISTANT

## 2024-04-04 PROCEDURE — 85025 COMPLETE CBC W/AUTO DIFF WBC: CPT | Performed by: PHYSICIAN ASSISTANT

## 2024-04-04 RX ORDER — POLYETHYLENE GLYCOL 3350 17 G/17G
17 POWDER, FOR SOLUTION ORAL DAILY PRN
Status: DISCONTINUED | OUTPATIENT
Start: 2024-04-04 | End: 2024-04-06 | Stop reason: HOSPADM

## 2024-04-04 RX ORDER — LOPERAMIDE HYDROCHLORIDE 2 MG/1
2 CAPSULE ORAL
Status: DISCONTINUED | OUTPATIENT
Start: 2024-04-04 | End: 2024-04-06 | Stop reason: HOSPADM

## 2024-04-04 RX ORDER — BENZTROPINE MESYLATE 1 MG/1
2 TABLET ORAL ONCE AS NEEDED
Status: DISCONTINUED | OUTPATIENT
Start: 2024-04-04 | End: 2024-04-06 | Stop reason: HOSPADM

## 2024-04-04 RX ORDER — BUPRENORPHINE HYDROCHLORIDE AND NALOXONE HYDROCHLORIDE DIHYDRATE 8; 2 MG/1; MG/1
1.75 TABLET SUBLINGUAL DAILY
Status: DISCONTINUED | OUTPATIENT
Start: 2024-04-05 | End: 2024-04-06 | Stop reason: HOSPADM

## 2024-04-04 RX ORDER — HYDROXYZINE HYDROCHLORIDE 25 MG/1
25 TABLET, FILM COATED ORAL 3 TIMES DAILY PRN
Status: CANCELLED | OUTPATIENT
Start: 2024-04-04

## 2024-04-04 RX ORDER — TRAZODONE HYDROCHLORIDE 50 MG/1
50 TABLET ORAL NIGHTLY PRN
Status: DISCONTINUED | OUTPATIENT
Start: 2024-04-04 | End: 2024-04-06 | Stop reason: HOSPADM

## 2024-04-04 RX ORDER — AMITRIPTYLINE HYDROCHLORIDE 10 MG/1
10 TABLET, FILM COATED ORAL NIGHTLY
COMMUNITY

## 2024-04-04 RX ORDER — BENZTROPINE MESYLATE 1 MG/ML
1 INJECTION INTRAMUSCULAR; INTRAVENOUS ONCE AS NEEDED
Status: DISCONTINUED | OUTPATIENT
Start: 2024-04-04 | End: 2024-04-06 | Stop reason: HOSPADM

## 2024-04-04 RX ORDER — AMITRIPTYLINE HYDROCHLORIDE 10 MG/1
10 TABLET, FILM COATED ORAL NIGHTLY
Status: DISCONTINUED | OUTPATIENT
Start: 2024-04-05 | End: 2024-04-06 | Stop reason: HOSPADM

## 2024-04-04 RX ORDER — FLUOXETINE HYDROCHLORIDE 20 MG/1
20 CAPSULE ORAL DAILY
Status: DISCONTINUED | OUTPATIENT
Start: 2024-04-05 | End: 2024-04-06 | Stop reason: HOSPADM

## 2024-04-04 RX ORDER — CHOLECALCIFEROL (VITAMIN D3) 125 MCG
5 CAPSULE ORAL NIGHTLY PRN
Status: DISCONTINUED | OUTPATIENT
Start: 2024-04-04 | End: 2024-04-06 | Stop reason: HOSPADM

## 2024-04-04 RX ORDER — ALBUTEROL SULFATE 90 UG/1
2 AEROSOL, METERED RESPIRATORY (INHALATION) EVERY 4 HOURS PRN
Status: DISCONTINUED | OUTPATIENT
Start: 2024-04-04 | End: 2024-04-06 | Stop reason: HOSPADM

## 2024-04-04 RX ORDER — ONDANSETRON 4 MG/1
4 TABLET, ORALLY DISINTEGRATING ORAL EVERY 6 HOURS PRN
Status: DISCONTINUED | OUTPATIENT
Start: 2024-04-04 | End: 2024-04-06 | Stop reason: HOSPADM

## 2024-04-04 RX ORDER — FAMOTIDINE 20 MG/1
20 TABLET, FILM COATED ORAL 2 TIMES DAILY PRN
Status: DISCONTINUED | OUTPATIENT
Start: 2024-04-04 | End: 2024-04-06 | Stop reason: HOSPADM

## 2024-04-04 RX ORDER — BUDESONIDE AND FORMOTEROL FUMARATE DIHYDRATE 160; 4.5 UG/1; UG/1
2 AEROSOL RESPIRATORY (INHALATION)
Status: DISCONTINUED | OUTPATIENT
Start: 2024-04-05 | End: 2024-04-06 | Stop reason: HOSPADM

## 2024-04-04 RX ORDER — HYDROXYZINE PAMOATE 25 MG/1
25 CAPSULE ORAL 3 TIMES DAILY PRN
COMMUNITY

## 2024-04-04 RX ORDER — BENZONATATE 100 MG/1
100 CAPSULE ORAL 3 TIMES DAILY PRN
Status: DISCONTINUED | OUTPATIENT
Start: 2024-04-04 | End: 2024-04-06 | Stop reason: HOSPADM

## 2024-04-04 RX ORDER — BUDESONIDE 0.5 MG/2ML
0.25 INHALANT ORAL 2 TIMES DAILY
COMMUNITY

## 2024-04-04 RX ORDER — CHOLECALCIFEROL (VITAMIN D3) 125 MCG
5 CAPSULE ORAL NIGHTLY PRN
COMMUNITY

## 2024-04-04 RX ORDER — HYDROXYZINE 50 MG/1
50 TABLET, FILM COATED ORAL EVERY 6 HOURS PRN
Status: DISCONTINUED | OUTPATIENT
Start: 2024-04-04 | End: 2024-04-06 | Stop reason: HOSPADM

## 2024-04-04 RX ORDER — BUDESONIDE AND FORMOTEROL FUMARATE DIHYDRATE 160; 4.5 UG/1; UG/1
2 AEROSOL RESPIRATORY (INHALATION)
COMMUNITY

## 2024-04-04 RX ORDER — POLYETHYLENE GLYCOL 3350 17 G/17G
17 POWDER, FOR SOLUTION ORAL DAILY PRN
COMMUNITY

## 2024-04-04 RX ORDER — CLONIDINE HYDROCHLORIDE 0.1 MG/1
0.1 TABLET ORAL 2 TIMES DAILY
Status: DISCONTINUED | OUTPATIENT
Start: 2024-04-05 | End: 2024-04-06 | Stop reason: HOSPADM

## 2024-04-04 RX ORDER — BUDESONIDE 0.5 MG/2ML
0.25 INHALANT ORAL 2 TIMES DAILY
Status: CANCELLED | OUTPATIENT
Start: 2024-04-05

## 2024-04-04 RX ORDER — ALBUTEROL SULFATE 1.25 MG/3ML
0.63 SOLUTION RESPIRATORY (INHALATION) EVERY 6 HOURS PRN
Status: CANCELLED | OUTPATIENT
Start: 2024-04-04

## 2024-04-04 RX ORDER — ACETAMINOPHEN 325 MG/1
650 TABLET ORAL EVERY 6 HOURS PRN
Status: DISCONTINUED | OUTPATIENT
Start: 2024-04-04 | End: 2024-04-06 | Stop reason: HOSPADM

## 2024-04-04 RX ORDER — ALUMINA, MAGNESIA, AND SIMETHICONE 2400; 2400; 240 MG/30ML; MG/30ML; MG/30ML
15 SUSPENSION ORAL EVERY 6 HOURS PRN
Status: DISCONTINUED | OUTPATIENT
Start: 2024-04-04 | End: 2024-04-06 | Stop reason: HOSPADM

## 2024-04-04 RX ORDER — IBUPROFEN 400 MG/1
400 TABLET ORAL EVERY 6 HOURS PRN
Status: DISCONTINUED | OUTPATIENT
Start: 2024-04-04 | End: 2024-04-06 | Stop reason: HOSPADM

## 2024-04-04 RX ORDER — CLONIDINE HYDROCHLORIDE 0.1 MG/1
0.1 TABLET ORAL 2 TIMES DAILY
COMMUNITY

## 2024-04-04 RX ORDER — FLUOXETINE HYDROCHLORIDE 20 MG/1
20 CAPSULE ORAL DAILY
COMMUNITY

## 2024-04-04 RX ORDER — PANTOPRAZOLE SODIUM 40 MG/1
40 TABLET, DELAYED RELEASE ORAL
Status: DISCONTINUED | OUTPATIENT
Start: 2024-04-05 | End: 2024-04-06 | Stop reason: HOSPADM

## 2024-04-04 RX ORDER — ECHINACEA PURPUREA EXTRACT 125 MG
2 TABLET ORAL AS NEEDED
Status: DISCONTINUED | OUTPATIENT
Start: 2024-04-04 | End: 2024-04-06 | Stop reason: HOSPADM

## 2024-04-04 RX ORDER — ALBUTEROL SULFATE 0.63 MG/3ML
1 SOLUTION RESPIRATORY (INHALATION) EVERY 6 HOURS PRN
COMMUNITY

## 2024-04-04 RX ADMIN — TRAZODONE HYDROCHLORIDE 50 MG: 50 TABLET ORAL at 22:05

## 2024-04-04 RX ADMIN — ACETAMINOPHEN 650 MG: 325 TABLET ORAL at 22:05

## 2024-04-04 RX ADMIN — HYDROXYZINE HYDROCHLORIDE 50 MG: 50 TABLET ORAL at 22:05

## 2024-04-04 NOTE — NURSING NOTE
"Pt assessment complete. Pt states [facility] brought her here, \"I'm here to detox off xanax and meth. I normally smoke at least 5 bars a day, and just occasionally snort meth.\"  Pt Last smoked meth yesterday.     Pt denies SI/HI/AVH.    Pt rates depression 8  Pt rates anxiety 10  Pt denies etoh.     COWS-4  CIWA-5    Pt states she is prescribed Suboxone from [facility] in [facility].  "

## 2024-04-04 NOTE — ED PROVIDER NOTES
Subjective   History of Present Illness  44-year-old female who presents to the ED today for detox evaluation.  She reports that she needs detox from Xanax.  She states she uses for 5 hours/day.  She states her last use was 2 days ago.  She states she feels very anxious and shaky and states it is hard to concentrate.  She denies any alcohol use.  She denies any suicidal ideations.  She states she is prescribed Suboxone and takes as prescribed.    History provided by:  Patient  Drug / Alcohol Assessment  Primary symptoms include confusion. Primary symptoms comment: requesting detox. This is a new problem. The current episode started yesterday. The problem has been gradually worsening. Suspected agents include prescription drugs. Pertinent negatives include no nausea and no vomiting. Associated medical issues include addiction treatment.       Review of Systems   Constitutional: Negative.    HENT: Negative.     Eyes: Negative.    Respiratory: Negative.     Cardiovascular: Negative.    Gastrointestinal:  Negative for nausea and vomiting.   Genitourinary: Negative.    Musculoskeletal: Negative.    Skin: Negative.    Neurological:  Positive for tremors.   Psychiatric/Behavioral:  Positive for confusion. Negative for suicidal ideas. The patient is nervous/anxious.    All other systems reviewed and are negative.      Past Medical History:   Diagnosis Date    Anxiety     Clotting disorder     Depression     Difficulty sleeping     GERD (gastroesophageal reflux disease)     Hepatitis C     Hx of hysterectomy     Hypertension     Opiate dependence     Pulmonary embolism 2004    x2     Substance abuse     Withdrawal symptoms, drug or narcotic        Allergies   Allergen Reactions    Sulfa Antibiotics Nausea And Vomiting       Past Surgical History:   Procedure Laterality Date    CARDIOVASCULAR STRESS TEST  10/25/2018    R. Stress- 4 Min, 32 Secs. 7.0 METS. 83% THR. BP- 137/72. Negative    ECHO - CONVERTED  10/25/2018    EF  65%. Mild MR. RVSP- 27 mmHg. Trace PE    HYSTERECTOMY      2009       Family History   Problem Relation Age of Onset    Drug abuse Mother     Alcohol abuse Father     Anxiety disorder Brother     Schizophrenia Paternal Aunt     Schizophrenia Maternal Uncle     Depression Maternal Grandmother     Heart disease Maternal Grandmother     Mitral valve prolapse Maternal Grandmother     Hyperlipidemia Maternal Grandmother     Alcohol abuse Paternal Grandfather        Social History     Socioeconomic History    Marital status: Single   Tobacco Use    Smoking status: Every Day     Current packs/day: 1.00     Average packs/day: 1 pack/day for 30.0 years (30.0 ttl pk-yrs)     Types: Cigarettes    Smokeless tobacco: Never   Vaping Use    Vaping status: Every Day    Substances: Nicotine    Devices: Disposable   Substance and Sexual Activity    Alcohol use: No    Drug use: Yes     Types: Methamphetamines, Benzodiazepines, Marijuana    Sexual activity: Not Currently     Partners: Male     Birth control/protection: Surgical           Objective   Physical Exam  Vitals and nursing note reviewed.   Constitutional:       General: She is not in acute distress.     Appearance: Normal appearance.   HENT:      Head: Normocephalic and atraumatic.      Right Ear: External ear normal.      Left Ear: External ear normal.      Nose: Nose normal.   Eyes:      Conjunctiva/sclera: Conjunctivae normal.      Pupils: Pupils are equal, round, and reactive to light.   Cardiovascular:      Rate and Rhythm: Normal rate and regular rhythm.      Pulses: Normal pulses.      Heart sounds: Normal heart sounds.   Pulmonary:      Effort: Pulmonary effort is normal.      Breath sounds: Normal breath sounds. No wheezing or rhonchi.   Abdominal:      General: Bowel sounds are normal.      Palpations: Abdomen is soft.      Tenderness: There is no abdominal tenderness.   Musculoskeletal:         General: Normal range of motion.      Cervical back: Normal range of  motion and neck supple.   Skin:     General: Skin is warm and dry.      Capillary Refill: Capillary refill takes less than 2 seconds.   Neurological:      General: No focal deficit present.      Mental Status: She is alert and oriented to person, place, and time.   Psychiatric:         Mood and Affect: Mood is anxious.         Thought Content: Thought content does not include homicidal or suicidal ideation.         Procedures         Results for orders placed or performed during the hospital encounter of 04/04/24   Comprehensive Metabolic Panel    Specimen: Arm, Right; Blood   Result Value Ref Range    Glucose 108 (H) 65 - 99 mg/dL    BUN 7 6 - 20 mg/dL    Creatinine 0.90 0.57 - 1.00 mg/dL    Sodium 136 136 - 145 mmol/L    Potassium 4.1 3.5 - 5.2 mmol/L    Chloride 99 98 - 107 mmol/L    CO2 25.6 22.0 - 29.0 mmol/L    Calcium 9.7 8.6 - 10.5 mg/dL    Total Protein 7.4 6.0 - 8.5 g/dL    Albumin 4.3 3.5 - 5.2 g/dL    ALT (SGPT) 9 1 - 33 U/L    AST (SGOT) 16 1 - 32 U/L    Alkaline Phosphatase 80 39 - 117 U/L    Total Bilirubin 0.3 0.0 - 1.2 mg/dL    Globulin 3.1 gm/dL    A/G Ratio 1.4 g/dL    BUN/Creatinine Ratio 7.8 7.0 - 25.0    Anion Gap 11.4 5.0 - 15.0 mmol/L    eGFR 81.0 >60.0 mL/min/1.73   Urinalysis With Microscopic If Indicated (No Culture) - Urine, Clean Catch    Specimen: Urine, Clean Catch   Result Value Ref Range    Color, UA Yellow Yellow, Straw    Appearance, UA Clear Clear    pH, UA 8.0 5.0 - 8.0    Specific Gravity, UA 1.006 1.005 - 1.030    Glucose, UA Negative Negative    Ketones, UA Negative Negative    Bilirubin, UA Negative Negative    Blood, UA Negative Negative    Protein, UA Negative Negative    Leuk Esterase, UA Negative Negative    Nitrite, UA Negative Negative    Urobilinogen, UA 0.2 E.U./dL 0.2 - 1.0 E.U./dL   Ethanol    Specimen: Arm, Right; Blood   Result Value Ref Range    Ethanol <10 0 - 10 mg/dL    Ethanol % <0.010 %   Urine Drug Screen - Urine, Clean Catch    Specimen: Urine, Clean Catch    Result Value Ref Range    THC, Screen, Urine Negative Negative    Phencyclidine (PCP), Urine Negative Negative    Cocaine Screen, Urine Negative Negative    Methamphetamine, Ur Negative Negative    Opiate Screen Negative Negative    Amphetamine Screen, Urine Negative Negative    Benzodiazepine Screen, Urine Positive (A) Negative    Tricyclic Antidepressants Screen Positive (A) Negative    Methadone Screen, Urine Negative Negative    Barbiturates Screen, Urine Negative Negative    Oxycodone Screen, Urine Negative Negative    Buprenorphine, Screen, Urine Positive (A) Negative   Magnesium    Specimen: Arm, Right; Blood   Result Value Ref Range    Magnesium 2.0 1.6 - 2.6 mg/dL   CBC Auto Differential    Specimen: Arm, Right; Blood   Result Value Ref Range    WBC 8.76 3.40 - 10.80 10*3/mm3    RBC 4.32 3.77 - 5.28 10*6/mm3    Hemoglobin 12.8 12.0 - 15.9 g/dL    Hematocrit 37.7 34.0 - 46.6 %    MCV 87.3 79.0 - 97.0 fL    MCH 29.6 26.6 - 33.0 pg    MCHC 34.0 31.5 - 35.7 g/dL    RDW 12.2 (L) 12.3 - 15.4 %    RDW-SD 38.9 37.0 - 54.0 fl    MPV 10.3 6.0 - 12.0 fL    Platelets 284 140 - 450 10*3/mm3    Neutrophil % 65.1 42.7 - 76.0 %    Lymphocyte % 25.9 19.6 - 45.3 %    Monocyte % 6.7 5.0 - 12.0 %    Eosinophil % 1.6 0.3 - 6.2 %    Basophil % 0.5 0.0 - 1.5 %    Immature Grans % 0.2 0.0 - 0.5 %    Neutrophils, Absolute 5.70 1.70 - 7.00 10*3/mm3    Lymphocytes, Absolute 2.27 0.70 - 3.10 10*3/mm3    Monocytes, Absolute 0.59 0.10 - 0.90 10*3/mm3    Eosinophils, Absolute 0.14 0.00 - 0.40 10*3/mm3    Basophils, Absolute 0.04 0.00 - 0.20 10*3/mm3    Immature Grans, Absolute 0.02 0.00 - 0.05 10*3/mm3    nRBC 0.0 0.0 - 0.2 /100 WBC   Fentanyl, Urine - Urine, Clean Catch    Specimen: Urine, Clean Catch   Result Value Ref Range    Fentanyl, Urine Negative Negative   Green Top (Gel)   Result Value Ref Range    Extra Tube Hold for add-ons.    Lavender Top   Result Value Ref Range    Extra Tube hold for add-on    Gold Top - SST   Result  Value Ref Range    Extra Tube Hold for add-ons.    Light Blue Top   Result Value Ref Range    Extra Tube Hold for add-ons.        ED Course  ED Course as of 04/04/24 2101 Thu Apr 04, 2024 1958 Endorsed to Landen Tena []      ED Course User Index  [] Minoo Singh PA                                             Medical Decision Making  History of Present Illness  44-year-old female who presents to the ED today for detox evaluation.  She reports that she needs detox from Xanax.  She states she uses for 5 hours/day.  She states her last use was 2 days ago.  She states she feels very anxious and shaky and states it is hard to concentrate.  She denies any alcohol use.  She denies any suicidal ideations.  She states she is prescribed Suboxone and takes as prescribed.    History provided by:  Patient  Drug / Alcohol Assessment  Primary symptoms include confusion. Primary symptoms comment: requesting detox. This is a new problem. The current episode started yesterday. The problem has been gradually worsening. Suspected agents include prescription drugs. Pertinent negatives include no nausea and no vomiting. Associated medical issues include addiction treatment.       Problems Addressed:  Benzodiazepine abuse: complicated acute illness or injury  Benzodiazepine withdrawal without complication: complicated acute illness or injury    Amount and/or Complexity of Data Reviewed  Labs: ordered. Decision-making details documented in ED Course.  ECG/medicine tests: ordered.        Final diagnoses:   Benzodiazepine abuse   Benzodiazepine withdrawal without complication   Electronically signed by ANALY Garcia, 04/04/24, 7:32 PM EDT.     ED Disposition  ED Disposition       ED Disposition   DC/Transfer to Behavioral Health Condition   Stable    Comment   --               No follow-up provider specified.       Medication List      No changes were made to your prescriptions during this visit.            Enrique Tena,  APRN  04/04/24 2104

## 2024-04-05 PROBLEM — F13.20 SEVERE BENZODIAZEPINE USE DISORDER: Status: ACTIVE | Noted: 2024-04-04

## 2024-04-05 LAB
QT INTERVAL: 378 MS
QTC INTERVAL: 374 MS

## 2024-04-05 PROCEDURE — 63710000001 ONDANSETRON ODT 4 MG TABLET DISPERSIBLE: Performed by: STUDENT IN AN ORGANIZED HEALTH CARE EDUCATION/TRAINING PROGRAM

## 2024-04-05 PROCEDURE — 94799 UNLISTED PULMONARY SVC/PX: CPT

## 2024-04-05 PROCEDURE — 94640 AIRWAY INHALATION TREATMENT: CPT

## 2024-04-05 PROCEDURE — 99222 1ST HOSP IP/OBS MODERATE 55: CPT | Performed by: PSYCHIATRY & NEUROLOGY

## 2024-04-05 PROCEDURE — G0378 HOSPITAL OBSERVATION PER HR: HCPCS

## 2024-04-05 PROCEDURE — 94760 N-INVAS EAR/PLS OXIMETRY 1: CPT

## 2024-04-05 PROCEDURE — 94664 DEMO&/EVAL PT USE INHALER: CPT

## 2024-04-05 RX ADMIN — TRAZODONE HYDROCHLORIDE 50 MG: 50 TABLET ORAL at 21:07

## 2024-04-05 RX ADMIN — HYDROXYZINE HYDROCHLORIDE 50 MG: 50 TABLET ORAL at 08:08

## 2024-04-05 RX ADMIN — PANTOPRAZOLE SODIUM 40 MG: 40 TABLET, DELAYED RELEASE ORAL at 05:31

## 2024-04-05 RX ADMIN — BUPRENORPHINE HYDROCHLORIDE AND NALOXONE HYDROCHLORIDE DIHYDRATE 1.75 TABLET: 8; 2 TABLET SUBLINGUAL at 08:05

## 2024-04-05 RX ADMIN — CLONIDINE HYDROCHLORIDE 0.1 MG: 0.1 TABLET ORAL at 01:36

## 2024-04-05 RX ADMIN — CLONIDINE HYDROCHLORIDE 0.1 MG: 0.1 TABLET ORAL at 21:07

## 2024-04-05 RX ADMIN — ACETAMINOPHEN 650 MG: 325 TABLET ORAL at 21:06

## 2024-04-05 RX ADMIN — FLUOXETINE HYDROCHLORIDE 20 MG: 20 CAPSULE ORAL at 08:07

## 2024-04-05 RX ADMIN — HYDROXYZINE HYDROCHLORIDE 50 MG: 50 TABLET ORAL at 21:06

## 2024-04-05 RX ADMIN — ACETAMINOPHEN 650 MG: 325 TABLET ORAL at 08:07

## 2024-04-05 RX ADMIN — BUDESONIDE AND FORMOTEROL FUMARATE DIHYDRATE 2 PUFF: 160; 4.5 AEROSOL RESPIRATORY (INHALATION) at 08:56

## 2024-04-05 RX ADMIN — AMITRIPTYLINE HYDROCHLORIDE 10 MG: 10 TABLET, FILM COATED ORAL at 01:36

## 2024-04-05 RX ADMIN — ALUMINUM HYDROXIDE, MAGNESIUM HYDROXIDE, AND DIMETHICONE 15 ML: 400; 400; 40 SUSPENSION ORAL at 12:46

## 2024-04-05 RX ADMIN — BUDESONIDE AND FORMOTEROL FUMARATE DIHYDRATE 2 PUFF: 160; 4.5 AEROSOL RESPIRATORY (INHALATION) at 20:19

## 2024-04-05 RX ADMIN — AMITRIPTYLINE HYDROCHLORIDE 10 MG: 10 TABLET, FILM COATED ORAL at 21:07

## 2024-04-05 RX ADMIN — ONDANSETRON 4 MG: 4 TABLET, ORALLY DISINTEGRATING ORAL at 08:04

## 2024-04-05 NOTE — PLAN OF CARE
Goal Outcome Evaluation:  Plan of Care Reviewed With: patient  Patient Agreement with Plan of Care: agrees     Progress: no change  Outcome Evaluation: Pt c/o anxiety 10/10 and headache. CIWA 5. Denies SI/HI/AVH. No distress noted.
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
## 2024-04-05 NOTE — PLAN OF CARE
Problem: Adult Behavioral Health Plan of Care  Goal: Plan of Care Review  Outcome: Ongoing, Progressing  Flowsheets (Taken 4/5/2024 1501)  Consent Given to Review Plan with: no consent  Progress: no change  Plan of Care Reviewed With: patient  Patient Agreement with Plan of Care: agrees  Outcome Evaluation: Reviewed plan of care and completed adult social history.  Goal: Patient-Specific Goal (Individualization)  Outcome: Ongoing, Progressing  Flowsheets  Taken 4/5/2024 1501 by Antionette Miller LCSW  Patient-Specific Goals (Include Timeframe): Juanita to complete medical detox prior to discharge, identify 1-2 healthy coping skills to assist with relapse prevention during patients 3-7 day hospital stay, engage in safe disposition planning prior to discharge  Individualized Care Needs: Medication management, individual and group therapy.  Taken 4/5/2024 1455 by Antionette Miller LCSW  Patient Personal Strengths: resourceful  Patient Vulnerabilities: substance abuse/addiction  Taken 4/4/2024 2152 by Maria Esther Eddy RN  Anxieties, Fears or Concerns: none voiced  Goal: Optimized Coping Skills in Response to Life Stressors  Outcome: Ongoing, Progressing  Flowsheets (Taken 4/5/2024 1501)  Optimized Coping Skills in Response to Life Stressors: making progress toward outcome  Intervention: Promote Effective Coping Strategies  Flowsheets (Taken 4/5/2024 1501)  Supportive Measures:   active listening utilized   positive reinforcement provided   self-responsibility promoted   counseling provided   problem-solving facilitated   decision-making supported   verbalization of feelings encouraged   goal-setting facilitated   self-care encouraged   self-reflection promoted  Goal: Develops/Participates in Therapeutic [place] to Support Successful Transition  Outcome: Ongoing, Progressing  Flowsheets (Taken 4/5/2024 1501)  Develops/Participates in Therapeutic [place] to Support Successful Transition: making  progress toward outcome  Intervention: Foster Therapeutic [place]  Flowsheets (Taken 4/5/2024 1501)  Trust Relationship/Rapport:   care explained   reassurance provided   choices provided   thoughts/feelings acknowledged   emotional support provided   empathic listening provided   questions answered   questions encouraged  Intervention: Mutually Develop Transition Plan  Flowsheets  Taken 4/5/2024 1501  Transition Support:   community resources reviewed   crisis management plan promoted   follow-up care discussed  Taken 4/5/2024 1452  Discharge Coordination/Progress: Patient has [employer] insurance and consents to return to [facility] upon stabilization.  Transportation Anticipated: car, drives self  Transportation Concerns: none  Current Discharge Risk:   psychiatric illness   substance use/abuse  Concerns to be Addressed:   coping/stress   substance/tobacco abuse/use  Readmission Within the Last 30 Days: no previous admission in last 30 days  Patient/Family Anticipated Services at Transition: rehabilitation services  Patient's Choice of Community Agency(s): patient consents to return to [facility]  Patient/Family Anticipates Transition to: inpatient rehabilitation facility  Offered/Gave Vendor List: no   Goal Outcome Evaluation:  Plan of Care Reviewed With: patient  Patient Agreement with Plan of Care: agrees  Consent Given to Review Plan with: no consent  Progress: no change  Outcome Evaluation: Reviewed plan of care and completed adult social history.    DATA:         Therapist met individually with patient this date to introduce role and to discuss hospitalization expectations. Patient agreeable.      Clinical Maneuvering/Intervention:     Therapist assisted patient in processing session content; acknowledged and normalized patient’s thoughts, feelings, and concerns.  Discussed the therapist/patient relationship and explain the parameters and limitations of relative confidentiality.  Also discussed the importance  of active participation, and honesty to the treatment process.  Encouraged the patient to discuss/vent their feelings, frustrations, and fears concerning their ongoing medical issues and validated their feelings.     Discussed the importance of finding enjoyable activities and coping skills that the patient can engage in a regular basis. Discussed healthy coping skills such as distraction, self love, grounding, thought challenges/reframing, etc.  Provided patient with list of healthy coping skills this date. Discussed the importance of medication compliance.  Praised the patient for seeking help and spent the majority of the session building rapport.       Allowed patient to freely discuss issues without interruption or judgment. Provided safe, confidential environment to facilitate the development of positive therapeutic relationship and encourage open, honest communication.      Therapist addressed discharge safety planning this date. Assisted patient in identifying risk factors which would indicate the need for higher level of care after discharge;  including thoughts to harm self or others and/or self-harming behavior. Encouraged patient to call 911, or present to the nearest emergency room should any of these events occur. Discussed crisis intervention services and means to access.  Encouraged securing any objects of harm.       Therapist completed integrated summary, treatment plan, and initiated social history this date.  Therapist is strongly encouraging family involvement in treatment.       ASSESSMENT:      Patient is a 44-year-old female who presents from [facility] requesting medical detox from daily Xanax and meth abuse. She has a history of admissions last being in July 2022. Patient reports attending [facility] following her last admission. Patient reports struggling with substance for many years. She has completed high school and some college. She is currently unemployed. She reports several attempts at  sobriety with minimal success. She reports going to [facility] and being sent here for detox. She consents to return.      PLAN:       Patient to remain hospitalized this date.     Treatment team will focus efforts on stabilizing patient's acute symptoms while providing education on healthy coping and crisis management to reduce hospitalizations.   Patient requires daily psychiatrist evaluation and 24/7 nursing supervision to promote patient  safety.     Therapist will offer 1-4 individual sessions, 1 therapy group daily, family education, and appropriate referral.    Patient consents to return to [facility] upon stabilization.

## 2024-04-05 NOTE — PLAN OF CARE
Goal Outcome Evaluation:  Plan of Care Reviewed With: patient  Patient Agreement with Plan of Care: agrees     Progress: no change  Outcome Evaluation: Patient states she was brought here by [facility] to detox off Xanax and Meth. Patient states she snorts 5 or more Xanax Bars a day and smokes a dime bag of Meth in 2-3 days. Patient reports she first started using xanax's at age 12 or 13 and used Meth for about 8 years off and on. Patient reports she is prescribed Suboxone. Patient rates her anxiety 10/10. Cravings 10/10. C/O chills, HA, and nausea. Denies SI/HI/AVH. Denies alcohol abuse. Appetite-poor. Sleep-poor. CIWA-9. COWS-7.

## 2024-04-05 NOTE — NURSING NOTE
Spoke to  discussed pt assessment, labs, and vitals. New orders to admit to detox on observation status.   RBVOx2   ED provider made aware.

## 2024-04-05 NOTE — PROGRESS NOTES
Navigator is helping with the following referral:    [facility] - [phone]  -Spoke with Carlotta. They will accept patient at discharge.  4/5

## 2024-04-06 VITALS
SYSTOLIC BLOOD PRESSURE: 119 MMHG | OXYGEN SATURATION: 96 % | WEIGHT: 197.8 LBS | RESPIRATION RATE: 18 BRPM | BODY MASS INDEX: 31.79 KG/M2 | HEIGHT: 66 IN | TEMPERATURE: 98 F | HEART RATE: 70 BPM | DIASTOLIC BLOOD PRESSURE: 70 MMHG

## 2024-04-06 PROCEDURE — G0378 HOSPITAL OBSERVATION PER HR: HCPCS

## 2024-04-06 PROCEDURE — 99239 HOSP IP/OBS DSCHRG MGMT >30: CPT | Performed by: PSYCHIATRY & NEUROLOGY

## 2024-04-06 PROCEDURE — 94664 DEMO&/EVAL PT USE INHALER: CPT

## 2024-04-06 PROCEDURE — 94799 UNLISTED PULMONARY SVC/PX: CPT

## 2024-04-06 RX ADMIN — BUDESONIDE AND FORMOTEROL FUMARATE DIHYDRATE 2 PUFF: 160; 4.5 AEROSOL RESPIRATORY (INHALATION) at 07:50

## 2024-04-06 RX ADMIN — FLUOXETINE HYDROCHLORIDE 20 MG: 20 CAPSULE ORAL at 08:15

## 2024-04-06 RX ADMIN — PANTOPRAZOLE SODIUM 40 MG: 40 TABLET, DELAYED RELEASE ORAL at 06:10

## 2024-04-06 RX ADMIN — CLONIDINE HYDROCHLORIDE 0.1 MG: 0.1 TABLET ORAL at 08:15

## 2024-04-06 RX ADMIN — BUPRENORPHINE HYDROCHLORIDE AND NALOXONE HYDROCHLORIDE DIHYDRATE 1.75 TABLET: 8; 2 TABLET SUBLINGUAL at 08:15

## 2024-04-06 NOTE — PLAN OF CARE
Goal Outcome Evaluation:  Plan of Care Reviewed With: patient  Patient Agreement with Plan of Care: agrees     Progress: improving  Outcome Evaluation: Pt reports fair appetite and good sleep. A 10 D 8. Denies SI/HI/AVH. Pt slept 8 hrs.

## 2024-04-06 NOTE — PLAN OF CARE
Goal Outcome Evaluation:  Plan of Care Reviewed With: patient  Patient Agreement with Plan of Care: agrees

## 2024-04-07 NOTE — DISCHARGE SUMMARY
"      PSYCHIATRIC DISCHARGE SUMMARY     Patient Identification:  Name:  Juanita Barnett  Age:  44 y.o.  Sex:  female  :  1980  MRN:  [id number]  Visit Number:  [id number]    Date of Admission:2024   Date of Discharge: 2024     Discharge Diagnosis:  Principal Problem:    Severe benzodiazepine use disorder  Active Problems:    Opioid use disorder, severe, on maintenance therapy, dependence      Admission Diagnosis:  Chemical dependency [F19.20]     Hospital Course  Patient is a 44 y.o. female presented with opioid and benzodiazepine dependence.  Admitted for observation for medically assisted detox.  Hep C antibody positive and UDS + benzo/buprenorphine/TCA.  Patient monitored during observation period.  No acutely concerning symptoms or vital sign changes that would necessitate further hospitalization.  No acutely concerning signs of benzodiazepine withdrawal.  Patient plans to continue Suboxone taper with outpatient provider.  Patient voiced readiness to return to rehab today and appears appropriate for discharge at this time.    By the conclusion of this hospitalization, patient is exhibiting no acutely concerning symptoms of mood, psychotic or thought disorder that would necessitate further inpatient care. Patient is also denying SI, HI, and AVH. Patient has shown improvement of presenting symptoms, exhibited no behavior concerning for harm to self or others, and is considered appropriate for discharge to a lower level of care today. Treatment and safe discharge planning completed. Outpatient care ascertained.     Mental Status Exam upon discharge:   Mood \"better\"   Affect-congruent, appropriate, stable  Thought Content-goal directed, no delusional material present  Thought process-linear, organized.  Suicidality: No SI  Homicidality: No HI  Perception: No /    Procedures Performed         Consults:   Consults       No orders found from 3/6/2024 to 2024.            Pertinent Test " Results:   Lab Results (last 7 days)       ** No results found for the last 168 hours. **            Condition on Discharge:  improved    Vital Signs  Temp:  [98 °F (36.7 °C)] 98 °F (36.7 °C)  Heart Rate:  [70] 70  Resp:  [18] 18  BP: (119)/(70) 119/70    Discharge Disposition:  Rehab Facility or Unit (DC - External)    Discharge Medications:     Discharge Medications        Continue These Medications        Instructions Start Date   amitriptyline 10 MG tablet  Commonly known as: ELAVIL   10 mg, Oral, Nightly      budesonide 0.5 MG/2ML nebulizer solution  Commonly known as: PULMICORT   0.25 mg, Nebulization, 2 Times Daily      budesonide-formoterol 160-4.5 MCG/ACT inhaler  Commonly known as: SYMBICORT   2 puffs, Inhalation, 2 Times Daily - RT      buprenorphine-naloxone 8-2 MG per SL tablet  Commonly known as: SUBOXONE   Place 1 & 3/4 tablets under the tongue Daily. Indications: Opioid Use Disorder (Continuation of Therapy)      cloNIDine 0.1 MG tablet  Commonly known as: CATAPRES   0.1 mg, Oral, 2 Times Daily      FLUoxetine 20 MG capsule  Commonly known as: PROzac   20 mg, Oral, Daily      hydrOXYzine pamoate 25 MG capsule  Commonly known as: VISTARIL   25 mg, Oral, 3 Times Daily PRN      melatonin 5 MG tablet tablet   5 mg, Oral, Nightly PRN      MiraLax 17 GM/SCOOP powder  Generic drug: polyethylene glycol   17 g, Oral, Daily PRN      omeprazole 20 MG capsule  Commonly known as: priLOSEC   20 mg, Oral, Daily      ProAir  (90 Base) MCG/ACT inhaler  Generic drug: albuterol sulfate HFA   Inhale 2 puffs by mouth Every 4 (Four) Hours As Needed for Wheezing. Indications: Chronic Obstructive Lung Disease      albuterol 0.63 MG/3ML nebulizer solution  Commonly known as: ACCUNEB   1 ampule, Nebulization, Every 6 Hours PRN               Discharge Diet: Normal    Activity at Discharge: Normal    Follow-up Appointments  No future appointments.      Test Results Pending at Discharge  None     Time: I spent greater  than 30 minutes on this discharge activity which included: face-to-face encounter with the patient, reviewing the data in the system, coordination of the care with the nursing staff as well as consultants, documentation, and entering orders.      Clinician:   Luis Jernigan MD  04/07/24  13:10 EDT